# Patient Record
Sex: FEMALE | Race: WHITE | NOT HISPANIC OR LATINO | Employment: PART TIME | URBAN - METROPOLITAN AREA
[De-identification: names, ages, dates, MRNs, and addresses within clinical notes are randomized per-mention and may not be internally consistent; named-entity substitution may affect disease eponyms.]

---

## 2018-12-03 ENCOUNTER — TELEPHONE (OUTPATIENT)
Dept: OTOLARYNGOLOGY | Facility: CLINIC | Age: 73
End: 2018-12-03

## 2018-12-03 NOTE — TELEPHONE ENCOUNTER
----- Message from Avani Al sent at 12/3/2018 10:33 AM CST -----  Contact: VALE SCOTT [[id number]]            Name of Who is Calling: VALE SCOTT [[id number]]      What is the request in detail: Patient would like to schedule new patient appt as soon as possible to see doctor for sore throat.States she is a previous patient for 30 years. Please call to schedule    Can the clinic reply by [facility]: no    What [facility] Back [facility] [phone]

## 2018-12-04 NOTE — TELEPHONE ENCOUNTER
----- Message from Kaela Reeves sent at 12/3/2018  3:25 PM CST -----  Contact: pt  Name of Who is Calling: VALE SCOTT [[id number]]        What is the request in detail: Pt returning call.. Please advise...     Can the clinic reply by [facility]: no     What [facility] [phone]

## 2020-03-09 ENCOUNTER — TELEPHONE (OUTPATIENT)
Dept: OTOLARYNGOLOGY | Facility: CLINIC | Age: 75
End: 2020-03-09

## 2020-03-09 RX ORDER — BENZONATATE 100 MG/1
CAPSULE ORAL
Qty: 40 CAPSULE | Refills: 1 | Status: SHIPPED | OUTPATIENT
Start: 2020-03-09

## 2020-03-09 NOTE — TELEPHONE ENCOUNTER
Called and spoke with pt to schedule her a new pt appointment with Dr. Aaron.      ----- Message from Miriam Tenorio sent at 3/9/2020 12:53 PM CDT -----  Contact: VALE SCOTT [[id number]]  Name of Who is Calling: VALE SCOTT [[id number]]     What is the request in detailPALVALE CRUZ [[id number]]  :Patient is requesting a call back  in regards to becoming a new Patient  Please contact to further discuss and advise      Can the clinic reply by [facility]: no     What Number to Call Back if not in [facility]:  [phone] (home)

## 2020-03-11 ENCOUNTER — TELEPHONE (OUTPATIENT)
Dept: OTOLARYNGOLOGY | Facility: CLINIC | Age: 75
End: 2020-03-11

## 2020-03-11 NOTE — TELEPHONE ENCOUNTER
----- Message from Nakia Merritt sent at 3/11/2020  8:50 AM CDT -----  Contact: Pt   Name of Who is Calling: VALE SCOTT [[id number]]    What is the request in detail: Patient is requesting a calll back regards to being seen today for a coughPlease contact to further discuss and advise      Can the clinic reply by [facility]: No     What [facility]  [phone] (home)

## 2020-03-13 ENCOUNTER — OFFICE VISIT (OUTPATIENT)
Dept: OTOLARYNGOLOGY | Facility: CLINIC | Age: 75
End: 2020-03-13
Payer: MEDICARE

## 2020-03-13 VITALS
DIASTOLIC BLOOD PRESSURE: 80 MMHG | BODY MASS INDEX: 47.05 KG/M2 | SYSTOLIC BLOOD PRESSURE: 183 MMHG | WEIGHT: 282.38 LBS | TEMPERATURE: 98 F | HEIGHT: 65 IN | HEART RATE: 63 BPM

## 2020-03-13 DIAGNOSIS — R05.9 COUGH: ICD-10-CM

## 2020-03-13 DIAGNOSIS — J34.2 NASAL SEPTAL DEVIATION: ICD-10-CM

## 2020-03-13 DIAGNOSIS — J01.90 ACUTE NON-RECURRENT SINUSITIS, UNSPECIFIED LOCATION: Primary | ICD-10-CM

## 2020-03-13 DIAGNOSIS — J30.9 ALLERGIC RHINITIS, UNSPECIFIED SEASONALITY, UNSPECIFIED TRIGGER: ICD-10-CM

## 2020-03-13 DIAGNOSIS — J45.40 MODERATE PERSISTENT ASTHMA WITHOUT COMPLICATION: ICD-10-CM

## 2020-03-13 DIAGNOSIS — R09.82 PND (POST-NASAL DRIP): ICD-10-CM

## 2020-03-13 PROCEDURE — 1101F PR PT FALLS ASSESS DOC 0-1 FALLS W/OUT INJ PAST YR: ICD-10-PCS | Mod: CPTII,S$GLB,, | Performed by: SPECIALIST

## 2020-03-13 PROCEDURE — 99214 OFFICE O/P EST MOD 30 MIN: CPT | Mod: 25,S$GLB,, | Performed by: SPECIALIST

## 2020-03-13 PROCEDURE — 1159F PR MEDICATION LIST DOCUMENTED IN MEDICAL RECORD: ICD-10-PCS | Mod: S$GLB,,, | Performed by: SPECIALIST

## 2020-03-13 PROCEDURE — 96372 THER/PROPH/DIAG INJ SC/IM: CPT | Mod: S$GLB,,, | Performed by: SPECIALIST

## 2020-03-13 PROCEDURE — 1101F PT FALLS ASSESS-DOCD LE1/YR: CPT | Mod: CPTII,S$GLB,, | Performed by: SPECIALIST

## 2020-03-13 PROCEDURE — 96372 PR INJECTION,THERAP/PROPH/DIAG2ST, IM OR SUBCUT: ICD-10-PCS | Mod: S$GLB,,, | Performed by: SPECIALIST

## 2020-03-13 PROCEDURE — 99214 PR OFFICE/OUTPT VISIT, EST, LEVL IV, 30-39 MIN: ICD-10-PCS | Mod: 25,S$GLB,, | Performed by: SPECIALIST

## 2020-03-13 PROCEDURE — 1159F MED LIST DOCD IN RCRD: CPT | Mod: S$GLB,,, | Performed by: SPECIALIST

## 2020-03-13 RX ORDER — BETAMETHASONE SODIUM PHOSPHATE AND BETAMETHASONE ACETATE 3; 3 MG/ML; MG/ML
6 INJECTION, SUSPENSION INTRA-ARTICULAR; INTRALESIONAL; INTRAMUSCULAR; SOFT TISSUE ONCE
Status: COMPLETED | OUTPATIENT
Start: 2020-03-13 | End: 2020-03-13

## 2020-03-13 RX ORDER — FLUCONAZOLE 150 MG/1
150 TABLET ORAL DAILY
Qty: 1 TABLET | Refills: 3 | Status: SHIPPED | OUTPATIENT
Start: 2020-03-13 | End: 2020-03-14

## 2020-03-13 RX ORDER — CEFTRIAXONE 1 G/1
1 INJECTION, POWDER, FOR SOLUTION INTRAMUSCULAR; INTRAVENOUS
Status: COMPLETED | OUTPATIENT
Start: 2020-03-13 | End: 2020-03-13

## 2020-03-13 RX ORDER — LIDOCAINE HYDROCHLORIDE 10 MG/ML
2 INJECTION INFILTRATION; PERINEURAL ONCE
Status: COMPLETED | OUTPATIENT
Start: 2020-03-13 | End: 2020-03-13

## 2020-03-13 RX ORDER — AMOXICILLIN AND CLAVULANATE POTASSIUM 875; 125 MG/1; MG/1
TABLET, FILM COATED ORAL
Qty: 20 TABLET | Refills: 0 | Status: SHIPPED | OUTPATIENT
Start: 2020-03-13

## 2020-03-13 RX ADMIN — CEFTRIAXONE 1 G: 1 INJECTION, POWDER, FOR SOLUTION INTRAMUSCULAR; INTRAVENOUS at 02:03

## 2020-03-13 RX ADMIN — LIDOCAINE HYDROCHLORIDE 2 ML: 10 INJECTION INFILTRATION; PERINEURAL at 03:03

## 2020-03-13 RX ADMIN — BETAMETHASONE SODIUM PHOSPHATE AND BETAMETHASONE ACETATE 6 MG: 3; 3 INJECTION, SUSPENSION INTRA-ARTICULAR; INTRALESIONAL; INTRAMUSCULAR; SOFT TISSUE at 02:03

## 2020-03-13 NOTE — PROGRESS NOTES
Subjective:       Patient ID: Rafaela Pearce is a 74 y.o. female.    Chief Complaint: Cough and shortness of breath    Seven 4-year-old female who is a patient from my former practice who has been having coughing for the last week.  She coughing paroxysms.  She also has asthma for which she uses in Advair Diskus 500/50 routinely and an albuterol HFA inhaler rescue, which she has not needed to use.  She has also been taking Tessalon Perles which of help.  The cough is subsided.  She now has  nasal congestion, postnasal drip and cough.  She does not have fever.  Initially secretions were clear and there are now yellow.    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  Her rehabilitation his been malini.  She has had 2 episodes where the prosthesis dislocated anteriorly requiring propofol sedation to relocate the prosthesis.  She is now using a walker for ambulation.    She has allergy problems in addition to her asthma.  Almost yearly she has a flare up of illness in the early spring and early fall.  She also develops secondary yeast infections whenever she takes antibiotics.  In the past Diflucan has been very effective in treating those episodes.    Review of Systems   Constitutional: Positive for fatigue. Negative for activity change, appetite change, chills and fever.   HENT: Positive for congestion, ear pain, postnasal drip, rhinorrhea, sinus pressure, sinus pain and sore throat. Negative for ear discharge, facial swelling, hearing loss, mouth sores, sneezing, tinnitus, trouble swallowing and voice change.    Eyes: Negative for photophobia, pain, discharge, redness, itching and visual disturbance.   Respiratory: Positive for cough, shortness of breath and wheezing. Negative for apnea and choking.    Cardiovascular: Negative for chest pain and palpitations.   Gastrointestinal: Negative for abdominal distention, abdominal pain, nausea and vomiting.   Musculoskeletal: Positive for  arthralgias, back pain and joint swelling. Negative for myalgias, neck pain and neck stiffness.   Skin: Negative.  Negative for color change, pallor and rash.   Allergic/Immunologic: Positive for environmental allergies. Negative for food allergies and immunocompromised state.   Neurological: Positive for headaches. Negative for dizziness, speech difficulty, weakness and light-headedness.   Hematological: Negative for adenopathy. Does not bruise/bleed easily.   Psychiatric/Behavioral: Negative for agitation, confusion, decreased concentration and sleep disturbance.       Objective:      Physical Exam   Constitutional: She is oriented to person, place, and time. She appears well-developed and well-nourished.   HENT:   Head: Normocephalic.   Right Ear: External ear and ear canal normal. Tympanic membrane is retracted. Tympanic membrane mobility is abnormal.   Left Ear: External ear and ear canal normal. Tympanic membrane is retracted. Tympanic membrane mobility is abnormal.   Nose: Mucosal edema (with inflamed turbinates bilaterall), rhinorrhea (yellow pus bilaterally) and septal deviation (To the right) present. No nasal deformity.   Mouth/Throat: Uvula is midline and mucous membranes are normal. No oral lesions. Posterior oropharyngeal erythema ( mild) present. No oropharyngeal exudate (erythema thin pus from the nasopharynx). No tonsillar exudate.   Eyes: Pupils are equal, round, and reactive to light. EOM and lids are normal. Right eye exhibits no discharge. Left eye exhibits no discharge. Right conjunctiva is injected. Left conjunctiva is injected.   Neck: Trachea normal, normal range of motion, full passive range of motion without pain and phonation normal. Neck supple. No neck rigidity. No thyroid mass and no thyromegaly present.   Cardiovascular: Normal rate, regular rhythm and normal heart sounds.   Pulmonary/Chest: No respiratory distress. She has decreased breath sounds ( Diffusely). She has no wheezes. She  has no rhonchi. She has no rales.   Abdominal: Soft. Bowel sounds are normal. There is no tenderness.   Musculoskeletal: Normal range of motion.        Right shoulder: Normal.   Lymphadenopathy:        Head (right side): No occipital adenopathy present.        Head (left side): No occipital adenopathy present.     She has no cervical adenopathy.   Neurological: She is alert and oriented to person, place, and time. She has normal strength. No cranial nerve deficit or sensory deficit. Gait normal.   Skin: Skin is warm and dry. No lesion, no petechiae and no rash noted. No cyanosis. Nails show no clubbing.   Psychiatric: She has a normal mood and affect. Her speech is normal and behavior is normal. Cognition and memory are normal.       Assessment:       1. Acute non-recurrent sinusitis, unspecified location    2. PND (post-nasal drip)    3. Cough    4. Allergic rhinitis, unspecified seasonality, unspecified trigger    5. Moderate persistent asthma without complication    6. Nasal septal deviation        Plan:       I am placing the patient on Augmentin.  If her condition worsens she needs to call.  If she develops temperature above 100.4, productive cough or shortness of breath she needs to go to an emergency room immediately.  I will recheck her at the end of her antibiotics on an as-needed basis.

## 2020-06-24 ENCOUNTER — OFFICE VISIT (OUTPATIENT)
Dept: URGENT CARE | Facility: CLINIC | Age: 75
End: 2020-06-24
Payer: MEDICARE

## 2020-06-24 ENCOUNTER — TELEPHONE (OUTPATIENT)
Dept: OTOLARYNGOLOGY | Facility: CLINIC | Age: 75
End: 2020-06-24

## 2020-06-24 VITALS
HEART RATE: 62 BPM | HEIGHT: 67 IN | OXYGEN SATURATION: 95 % | RESPIRATION RATE: 16 BRPM | SYSTOLIC BLOOD PRESSURE: 127 MMHG | DIASTOLIC BLOOD PRESSURE: 68 MMHG | WEIGHT: 275 LBS | TEMPERATURE: 98 F | BODY MASS INDEX: 43.16 KG/M2

## 2020-06-24 DIAGNOSIS — R43.9 PROBLEMS WITH SMELL AND TASTE: ICD-10-CM

## 2020-06-24 DIAGNOSIS — R05.9 COUGH: ICD-10-CM

## 2020-06-24 DIAGNOSIS — B37.0 CANDIDIASIS OF MOUTH: ICD-10-CM

## 2020-06-24 DIAGNOSIS — Z01.84 ENCOUNTER FOR ANTIBODY RESPONSE EXAMINATION: ICD-10-CM

## 2020-06-24 DIAGNOSIS — Z20.822 SUSPECTED COVID-19 VIRUS INFECTION: Primary | ICD-10-CM

## 2020-06-24 PROCEDURE — 86769 SARS-COV-2 COVID-19 ANTIBODY: CPT

## 2020-06-24 PROCEDURE — 71046 X-RAY EXAM CHEST 2 VIEWS: CPT | Mod: S$GLB,,, | Performed by: RADIOLOGY

## 2020-06-24 PROCEDURE — 99214 PR OFFICE/OUTPT VISIT, EST, LEVL IV, 30-39 MIN: ICD-10-PCS | Mod: S$GLB,,, | Performed by: INTERNAL MEDICINE

## 2020-06-24 PROCEDURE — U0003 INFECTIOUS AGENT DETECTION BY NUCLEIC ACID (DNA OR RNA); SEVERE ACUTE RESPIRATORY SYNDROME CORONAVIRUS 2 (SARS-COV-2) (CORONAVIRUS DISEASE [COVID-19]), AMPLIFIED PROBE TECHNIQUE, MAKING USE OF HIGH THROUGHPUT TECHNOLOGIES AS DESCRIBED BY CMS-2020-01-R: HCPCS

## 2020-06-24 PROCEDURE — 99214 OFFICE O/P EST MOD 30 MIN: CPT | Mod: S$GLB,,, | Performed by: INTERNAL MEDICINE

## 2020-06-24 PROCEDURE — 71046 XR CHEST PA AND LATERAL: ICD-10-PCS | Mod: S$GLB,,, | Performed by: RADIOLOGY

## 2020-06-24 RX ORDER — NEBIVOLOL 10 MG/1
TABLET ORAL
COMMUNITY
Start: 2014-06-06

## 2020-06-24 RX ORDER — TORSEMIDE 20 MG/1
TABLET ORAL
COMMUNITY
Start: 2020-06-15

## 2020-06-24 RX ORDER — FUROSEMIDE 20 MG/1
TABLET ORAL
COMMUNITY

## 2020-06-24 RX ORDER — NAPROXEN AND ESOMEPRAZOLE MAGNESIUM 20; 500 MG/1; MG/1
TABLET, DELAYED RELEASE ORAL
COMMUNITY

## 2020-06-24 RX ORDER — ATENOLOL 25 MG/1
TABLET ORAL
COMMUNITY

## 2020-06-24 RX ORDER — AMLODIPINE AND VALSARTAN 10; 320 MG/1; MG/1
TABLET ORAL
COMMUNITY
Start: 2014-06-06

## 2020-06-24 RX ORDER — FLUCONAZOLE 150 MG/1
150 TABLET ORAL DAILY
Qty: 2 TABLET | Refills: 0 | Status: SHIPPED | OUTPATIENT
Start: 2020-06-24 | End: 2020-06-25

## 2020-06-24 RX ORDER — SERTRALINE HYDROCHLORIDE 100 MG/1
TABLET, FILM COATED ORAL
COMMUNITY

## 2020-06-24 RX ORDER — HYDRALAZINE HYDROCHLORIDE 10 MG/1
TABLET, FILM COATED ORAL
COMMUNITY

## 2020-06-24 RX ORDER — ROSUVASTATIN CALCIUM 10 MG/1
TABLET, COATED ORAL
COMMUNITY

## 2020-06-24 RX ORDER — CELECOXIB 200 MG/1
CAPSULE ORAL
COMMUNITY
Start: 2014-06-23

## 2020-06-24 RX ORDER — GABAPENTIN 300 MG/1
CAPSULE ORAL
COMMUNITY
Start: 2020-01-13

## 2020-06-24 RX ORDER — FLUTICASONE PROPIONATE AND SALMETEROL 500; 50 UG/1; UG/1
1 POWDER RESPIRATORY (INHALATION) 2 TIMES DAILY
COMMUNITY
Start: 2020-04-01

## 2020-06-24 NOTE — PROGRESS NOTES
"Subjective:       Patient ID: Rafaela Pearce is a 75 y.o. female.    Vitals:  height is 5' 7" (1.702 m) and weight is 124.7 kg (275 lb). Her tympanic temperature is 98.3 °F (36.8 °C). Her blood pressure is 127/68 and her pulse is 62. Her respiration is 16 and oxygen saturation is 95%.     Chief Complaint: Dizziness and COVID-19 Concerns    74 y/o female with PMHx of HTN, HLD, Asthma, Vertigo, and Polio presents to urgent care c/o dizziness intermittently for 6 months. Patient has Hx of Vertigo and states that it feels similar but she also feels fatigued and started with a dry cough a few days ago. Patient also reports spots on tongue and lost of taste since end of March for which she has been treated for with diflucan before by PCP and she would like a refill. Patient also requests to be covid tested.  Pt has been taking Meclizine intermittently with relief. Pt denies recent falls/head trauma/illness/travel, fever, chills, ear pain, sore throat, nasal congestion, cough, SOB, chest pain, leg pain/swelling, N/V/D, abdominal pain, back pain, neck pain, headache, slurred speech, syncope, vision changes, numbness or focal weakness.      Dizziness:   Chronicity:  Recurrent and chronic  Onset:  More than 1 month ago (6 months off and on worse lately)  Progression since onset:  Gradually worsening  Duration: always dizzy.  Dizziness characteristics:  Sensation of movement and lightheaded/impending faint   Associated symptoms: light-headedness.no ear pain, no fever, no nausea, no vomiting, no diaphoresis, no aural fullness, no syncope, no palpitations, no facial weakness, no slurred speech and no chest pain.  Exacerbated by: walking.  Treatments tried:  Meclizine  Improvements on treatment:  Moderate      Constitution: Negative for chills, sweating, fatigue and fever.   HENT: Negative for ear pain, congestion, sinus pain, sinus pressure, sore throat and voice change.    Neck: Negative for painful lymph nodes. "   Cardiovascular: Negative for chest pain, palpitations and passing out.   Eyes: Negative for eye redness.   Respiratory: Negative for chest tightness, cough, sputum production, bloody sputum, COPD, shortness of breath, stridor, wheezing and asthma.    Gastrointestinal: Negative for nausea and vomiting.   Musculoskeletal: Negative for muscle ache.   Skin: Negative for rash.   Allergic/Immunologic: Negative for seasonal allergies and asthma.   Neurological: Positive for dizziness and light-headedness.   Hematologic/Lymphatic: Negative for swollen lymph nodes.       Objective:      Physical Exam   Constitutional: She is oriented to person, place, and time. She appears well-developed. She is cooperative.  Non-toxic appearance. She does not appear ill. No distress.   HENT:   Head: Normocephalic and atraumatic.   Right Ear: Hearing, tympanic membrane, external ear and ear canal normal.   Left Ear: Hearing, tympanic membrane, external ear and ear canal normal.   Nose: Nose normal. No mucosal edema, rhinorrhea or nasal deformity. No epistaxis. Right sinus exhibits no maxillary sinus tenderness and no frontal sinus tenderness. Left sinus exhibits no maxillary sinus tenderness and no frontal sinus tenderness.   Mouth/Throat: Uvula is midline, oropharynx is clear and moist and mucous membranes are normal. Mucous membranes are moist. No trismus in the jaw. Normal dentition. No uvula swelling. No oropharyngeal exudate, posterior oropharyngeal edema or posterior oropharyngeal erythema. Oropharynx is clear.   Eyes: Pupils are equal, round, and reactive to light. Conjunctivae and lids are normal. No scleral icterus.   Neck: Trachea normal, normal range of motion, full passive range of motion without pain and phonation normal. Neck supple. No neck rigidity. No edema and no erythema present.   Cardiovascular: Normal rate, regular rhythm, normal heart sounds and normal pulses.   Pulmonary/Chest: Effort normal and breath sounds  normal. No respiratory distress. She has no decreased breath sounds. She has no wheezes. She has no rhonchi. She has no rales.   Abdominal: Normal appearance.   Musculoskeletal: Normal range of motion.         General: No swelling.   Neurological: She is alert and oriented to person, place, and time. No cranial nerve deficit or sensory deficit. She exhibits normal muscle tone. Coordination normal.   Skin: Skin is warm, dry, intact, not diaphoretic and not pale.   Psychiatric: Her speech is normal and behavior is normal. Mood, judgment and thought content normal.   Nursing note and vitals reviewed.        Assessment:       1. Suspected Covid-19 Virus Infection    2. Cough    3. Problems with smell and taste    4. Encounter for antibody response examination    5. Candidiasis of mouth        Plan:         Suspected Covid-19 Virus Infection  -     COVID-19 Routine Screening  -     XR CHEST PA AND LATERAL; Future; Expected date: 06/24/2020    Cough  -     COVID-19 Routine Screening  -     XR CHEST PA AND LATERAL; Future; Expected date: 06/24/2020    Problems with smell and taste  -     COVID-19 Routine Screening    Encounter for antibody response examination  -     COVID-19 Routine Screening  -     COVID-19 (SARS CoV-2) IgG Antibody    Candidiasis of mouth  -     Discontinue: fluconazole (DIFLUCAN) 150 MG Tab; Take 1 tablet (150 mg total) by mouth once daily. for 2 doses  Dispense: 2 tablet; Refill: 0  -     Discontinue: fluconazole (DIFLUCAN) 150 MG Tab; Take 1 tablet (150 mg total) by mouth once. for 1 dose  Dispense: 1 tablet; Refill: 1  -     fluconazole (DIFLUCAN) 150 MG Tab; Take 1 tablet (150 mg total) by mouth once. for 1 dose  Dispense: 1 tablet; Refill: 1    Xr Chest Pa And Lateral    Result Date: 6/24/2020  EXAMINATION: XR CHEST PA AND LATERAL CLINICAL HISTORY: Other general symptoms and signs TECHNIQUE: PA and lateral views of the chest were performed. COMPARISON: 07/09/2010 FINDINGS: There is no  consolidation, effusion, or pneumothorax.  Cardiomediastinal silhouette is unremarkable.  Bilateral shoulder arthroplasty noted.     No acute cardiopulmonary process. Electronically signed by: Bridger Boss MD Date:    06/24/2020 Time:    16:11       *74 y/o female with PMHx of HTN, HLD, Asthma, Vertigo, and Polio presents to urgent care c/o dizziness intermittently for 6 months. Patient has Hx of Vertigo and states that it feels similar but she also feels fatigued and started with a dry cough a few days ago. VSS. Resting and ambulatory SpO2 95-96%. PE unremarkable. CXR negative. Suggested doing an EKG to ensure nothing is going on with her heart, but she is in a rush and just wants to be evaluated for COVID. Discussed results/diagnosis/plan with patient in clinic. Instructed to follow up with PCP and/or cardiologist within 3-5 days for follow up and further workup to determine exact cause of dizziness. Patient was given strict ED instructions. Patient verbally understood and agreed with treatment plan.    Patient Instructions   Instructions for Patients with Confirmed or Suspected COVID-19    If you are awaiting your test result, you will either be called or it will be released to the patient portal.  If you have any questions about your test, please visit [email or web address] or call our COVID-19 information line at [phone].      Preventing the Spread of Coronavirus Disease 2019 (COVID-19) in Homes and Residential Communities -- Patients     Prevention steps for people with confirmed or suspected COVID-19 (including persons under investigation) who do not need to be hospitalized and people with confirmed COVID-19 who were hospitalized and determined to be medically stable to go home.    Stay home except to get medical care.    Separate yourself from other people and animals in your home.    Call ahead before visiting your doctor.    Wear a face mask.    Cover your coughs and sneezes.    Clean  your hands often.    Avoid sharing personal household items.    Clean all high-touch surfaces every day.    Monitor your symptoms. Seek prompt medical attention if your illness is worsening (e.g., difficulty breathing). Before seeking care, call your healthcare provider.    If you have a medical emergency and must call 911, notify the dispatcher that you have or are being evaluated for COVID-19. If possible, put on a face mask before emergency medical services arrive.    Use the following symptom-based strategy to return to normal activity following a suspected or confirmed case of COVID-19. Continue isolation until:   o At least 3 days (72 hours) have passed since recovery defined as resolution of fever without the use of fever-reducing medications and improvement in respiratory symptoms (e.g. cough, shortness of breath), and   o At least 10 days have passed since symptoms first appeared.     Precautions for household members, intimate partners and caregivers in a non-healthcare setting of a patient with symptomatic laboratory-confirmed COVID-19 or a patient under investigation.     Household members, intimate partners and caregivers in a non-healthcare setting may have close contact with a person with symptomatic, laboratory-confirmed COVID-19 or a person under investigation. Close contacts should monitor their health; they should call their healthcare provider right away if they develop symptoms suggestive of COVID-19 (e.g., fever, cough, shortness of breath). Close contacts should also follow these recommendations:      Make sure that you understand and can help the patient follow their healthcare providers instructions for medication(s) and care. You should help the patient with basic needs in the home and provide support for getting groceries, prescriptions, and other personal needs.    Monitor the patients symptoms. If the patient is getting sicker, call his or her healthcare provider and tell them  that the patient has laboratory-confirmed COVID-19. This will help the healthcare providers office take steps to keep people in the office or waiting room from getting infected. Ask the healthcare provider to call the local or state health department for additional guidance. If the patient has a medical emergency and you need to call 911, notify the dispatch personnel that the patient has or is being evaluated for COVID-19.    Household members should stay in another room or be  from the patient as much as possible. Household members should use a separate bedroom and bathroom, if available.    Prohibit visitors who do not have an essential need to be in the home.    Household members should care for any pets. Do not handle pets or other animals while sick.    Make sure that shared spaces in the home have good air flow, such as by an air conditioner.    Perform hand hygiene frequently. Wash your hands often with soap and water for at least 20 seconds or use an alcohol-based hand  that contains 60 to 95% alcohol, covering all surfaces of your hands and rubbing them together until they feel dry. Soap and water are preferred if hands are visibly dirty.    Avoid touching your eyes, nose and mouth with unwashed hands.    The patient should wear a face mask when you are around other people. If the patient is not able to wear a face mask (for example, because it causes trouble breathing), you, as the caregiver, should wear a mask when you are in the same room as the patient.    Wear a disposable face mask and gloves when you touch or have contact with the patients blood, stool or body fluids, such as saliva, sputum, nasal mucus, vomit and urine.   o Throw out disposable face masks and gloves after using them. Do not reuse.   o When removing personal protective equipment, first remove and dispose of gloves. Then, immediately clean your hands with soap and water or alcohol-based hand .  Next, remove and dispose of face mask, and immediately clean your hands again with soap and water or alcohol-based hand .    Avoid sharing household items with the patient. You should not share dishes, drinking glasses, cups, eating utensils, towels, bedding or other items. After the patient uses these items, you should wash them thoroughly (see below Wash laundry thoroughly).    Clean all high-touch surfaces, such as counters, tabletops, doorknobs, bathroom fixtures, toilets, phones, keyboards, tablets and bedside tables, every day. Also, clean any surfaces that may have blood, stool or body fluids on them.   o Use a household cleaning spray or wipe, according to the label instructions. Labels contain instructions for safe and effective use of the cleaning product including precautions you should take when applying the product, such as wearing gloves and making sure you have good ventilation during use of the product.    Wash laundry thoroughly.   o Immediately remove and wash clothes or bedding that have blood, stool or body fluids on them.  o Wear disposable gloves while handling soiled items and keep soiled items away from your body. Clean your hands (with soap and water or an alcohol-based hand ) immediately after removing your gloves.   o Read and follow directions on labels of laundry or clothing items and detergent. In general, using a normal laundry detergent according to washing machine instructions and dry thoroughly using the warmest temperatures recommended on the clothing label.    Place all used disposable gloves, face masks and other contaminated items in a lined container before disposing of them with other household waste. Clean your hands (with soap and water or an alcohol-based hand ) immediately after handling these items. Soap and water should be used preferentially if hands are visibly dirty.   Discuss any additional questions with your state or local health  department    *We will call you with your COVID test results within 1-3 days once they arrive.      *Please go immediately to the Emergency Department if you develop shortness of breath, chest pain, and uncontrollable fever above 100.4F            SOCIAL DISTANCE + WEAR A MASK :)    Below are suggestions for symptomatic relief:    - Tylenol every 4 hours OR ibuprofen every 6 hours as needed for pain/fever/chills/body aches  - Salt water gargles to soothe throat pain.  - Chloroseptic spray also helps to numb throat pain.  - Warm face compresses to help with facial sinus pain/pressure.  - Vicks vapor rub at night.  - Flonase OTC nasal spray for nasal congestion.  - Simple foods like chicken noodle soup, smoothies, hot tea with lemon and honey  - If you were not given a prescription cough medication, then Delsym OTC helps with coughing at night  - take tessalon pearls for daytime cough suppressant   - Zyrtec/Claritin during the day & Benadryl at night may help with any allergies      If you DO have Hypertension or palpitations, it is safe to take Coricidin HBP for relief of sinus symptoms.     Please follow up with your primary care provider within 2-5 days if your signs and symptoms have not resolved or worsen.

## 2020-06-24 NOTE — TELEPHONE ENCOUNTER
Returned pt call. Informed pt that Dr. Aaron is out the rest of the week and that the earliest appointment is on 7/2/20. Scheduled pt for that date and informed her that she'll be called if there are any cancellations.         ----- Message from Beverly Dominguez sent at 6/24/2020  8:19 AM CDT -----    Name of Who is Calling:VALE SCOTT [[id number]]    What is the request in detail: Pt would like a call back regarding a sooner appointment concerning vertigo , first available July 2 , 2020 Please contact to further discuss and advise    Can the clinic reply by [facility]: No    What Number to Call Back if not in [facility]: [phone]

## 2020-06-24 NOTE — PATIENT INSTRUCTIONS
Instructions for Patients with Confirmed or Suspected COVID-19    If you are awaiting your test result, you will either be called or it will be released to the patient portal.  If you have any questions about your test, please visit [email or web address] or call our COVID-19 information line at [phone].      Preventing the Spread of Coronavirus Disease 2019 (COVID-19) in Homes and Residential Communities -- Patients     Prevention steps for people with confirmed or suspected COVID-19 (including persons under investigation) who do not need to be hospitalized and people with confirmed COVID-19 who were hospitalized and determined to be medically stable to go home.    Stay home except to get medical care.    Separate yourself from other people and animals in your home.    Call ahead before visiting your doctor.    Wear a face mask.    Cover your coughs and sneezes.    Clean your hands often.    Avoid sharing personal household items.    Clean all high-touch surfaces every day.    Monitor your symptoms. Seek prompt medical attention if your illness is worsening (e.g., difficulty breathing). Before seeking care, call your healthcare provider.    If you have a medical emergency and must call 911, notify the dispatcher that you have or are being evaluated for COVID-19. If possible, put on a face mask before emergency medical services arrive.    Use the following symptom-based strategy to return to normal activity following a suspected or confirmed case of COVID-19. Continue isolation until:   o At least 3 days (72 hours) have passed since recovery defined as resolution of fever without the use of fever-reducing medications and improvement in respiratory symptoms (e.g. cough, shortness of breath), and   o At least 10 days have passed since symptoms first appeared.     Precautions for household members, intimate partners and caregivers in a non-healthcare setting of a patient with symptomatic  laboratory-confirmed COVID-19 or a patient under investigation.     Household members, intimate partners and caregivers in a non-healthcare setting may have close contact with a person with symptomatic, laboratory-confirmed COVID-19 or a person under investigation. Close contacts should monitor their health; they should call their healthcare provider right away if they develop symptoms suggestive of COVID-19 (e.g., fever, cough, shortness of breath). Close contacts should also follow these recommendations:      Make sure that you understand and can help the patient follow their healthcare providers instructions for medication(s) and care. You should help the patient with basic needs in the home and provide support for getting groceries, prescriptions, and other personal needs.    Monitor the patients symptoms. If the patient is getting sicker, call his or her healthcare provider and tell them that the patient has laboratory-confirmed COVID-19. This will help the healthcare providers office take steps to keep people in the office or waiting room from getting infected. Ask the healthcare provider to call the local or [facility] health department for additional guidance. If the patient has a medical emergency and you need to call 911, notify the dispatch personnel that the patient has or is being evaluated for COVID-19.    Household members should stay in another room or be  from the patient as much as possible. Household members should use a separate bedroom and bathroom, if available.    Prohibit visitors who do not have an essential need to be in the home.    Household members should care for any pets. Do not handle pets or other animals while sick.    Make sure that shared spaces in the home have good air flow, such as by an air conditioner.    Perform hand hygiene frequently. Wash your hands often with soap and water for at least 20 seconds or use an alcohol-based hand  that contains 60 to  95% alcohol, covering all surfaces of your hands and rubbing them together until they feel dry. Soap and water are preferred if hands are visibly dirty.    Avoid touching your eyes, nose and mouth with unwashed hands.    The patient should wear a face mask when you are around other people. If the patient is not able to wear a face mask (for example, because it causes trouble breathing), you, as the caregiver, should wear a mask when you are in the same room as the patient.    Wear a disposable face mask and gloves when you touch or have contact with the patients blood, stool or body fluids, such as saliva, sputum, nasal mucus, vomit and urine.   o Throw out disposable face masks and gloves after using them. Do not reuse.   o When removing personal protective equipment, first remove and dispose of gloves. Then, immediately clean your hands with soap and water or alcohol-based hand . Next, remove and dispose of face mask, and immediately clean your hands again with soap and water or alcohol-based hand .    Avoid sharing household items with the patient. You should not share dishes, drinking glasses, cups, eating utensils, towels, bedding or other items. After the patient uses these items, you should wash them thoroughly (see below Wash laundry thoroughly).    Clean all high-touch surfaces, such as counters, tabletops, doorknobs, bathroom fixtures, toilets, phones, keyboards, tablets and bedside tables, every day. Also, clean any surfaces that may have blood, stool or body fluids on them.   o Use a household cleaning spray or wipe, according to the label instructions. Labels contain instructions for safe and effective use of the cleaning product including precautions you should take when applying the product, such as wearing gloves and making sure you have good ventilation during use of the product.    Wash laundry thoroughly.   o Immediately remove and wash clothes or bedding that have  blood, stool or body fluids on them.  o Wear disposable gloves while handling soiled items and keep soiled items away from your body. Clean your hands (with soap and water or an alcohol-based hand ) immediately after removing your gloves.   o Read and follow directions on labels of laundry or clothing items and detergent. In general, using a normal laundry detergent according to washing machine instructions and dry thoroughly using the warmest temperatures recommended on the clothing label.    Place all used disposable gloves, face masks and other contaminated items in a lined container before disposing of them with other household waste. Clean your hands (with soap and water or an alcohol-based hand ) immediately after handling these items. Soap and water should be used preferentially if hands are visibly dirty.   Discuss any additional questions with your state or local health department    *We will call you with your COVID test results within 1-3 days once they arrive.      *Please go immediately to the Emergency Department if you develop shortness of breath, chest pain, and uncontrollable fever above 100.4F            SOCIAL DISTANCE + WEAR A MASK :)    Below are suggestions for symptomatic relief:    - Tylenol every 4 hours OR ibuprofen every 6 hours as needed for pain/fever/chills/body aches  - Salt water gargles to soothe throat pain.  - Chloroseptic spray also helps to numb throat pain.  - Warm face compresses to help with facial sinus pain/pressure.  - Vicks vapor rub at night.  - Flonase OTC nasal spray for nasal congestion.  - Simple foods like chicken noodle soup, smoothies, hot tea with lemon and honey  - If you were not given a prescription cough medication, then Delsym OTC helps with coughing at night  - take tessalon pearls for daytime cough suppressant   - Zyrtec/Claritin during the day & Benadryl at night may help with any allergies      If you DO have Hypertension or  palpitations, it is safe to take Coricidin HBP for relief of sinus symptoms.     Please follow up with your primary care provider within 2-5 days if your signs and symptoms have not resolved or worsen.

## 2020-06-25 LAB — SARS-COV-2 IGG SERPLBLD QL IA.RAPID: NEGATIVE

## 2020-06-25 RX ORDER — FLUCONAZOLE 150 MG/1
150 TABLET ORAL ONCE
Qty: 1 TABLET | Refills: 1 | Status: SHIPPED | OUTPATIENT
Start: 2020-06-25 | End: 2020-06-25

## 2020-06-28 ENCOUNTER — TELEPHONE (OUTPATIENT)
Dept: URGENT CARE | Facility: CLINIC | Age: 75
End: 2020-06-28

## 2020-06-28 LAB — SARS-COV-2 RNA RESP QL NAA+PROBE: NOT DETECTED

## 2020-07-02 ENCOUNTER — OFFICE VISIT (OUTPATIENT)
Dept: OTOLARYNGOLOGY | Facility: CLINIC | Age: 75
End: 2020-07-02
Payer: MEDICARE

## 2020-07-02 ENCOUNTER — TELEPHONE (OUTPATIENT)
Dept: OTOLARYNGOLOGY | Facility: CLINIC | Age: 75
End: 2020-07-02

## 2020-07-02 VITALS
TEMPERATURE: 97 F | BODY MASS INDEX: 43.48 KG/M2 | HEART RATE: 65 BPM | SYSTOLIC BLOOD PRESSURE: 149 MMHG | WEIGHT: 277.63 LBS | DIASTOLIC BLOOD PRESSURE: 82 MMHG

## 2020-07-02 DIAGNOSIS — J34.2 NASAL SEPTAL DEVIATION: ICD-10-CM

## 2020-07-02 DIAGNOSIS — B37.0 ORAL CANDIDIASIS: ICD-10-CM

## 2020-07-02 DIAGNOSIS — R43.9 SMELL AND TASTE DISORDER: ICD-10-CM

## 2020-07-02 DIAGNOSIS — K14.6 TONGUE BURNING SENSATION: ICD-10-CM

## 2020-07-02 DIAGNOSIS — H93.13 TINNITUS OF BOTH EARS: ICD-10-CM

## 2020-07-02 DIAGNOSIS — R26.89 BALANCE DISORDER: Primary | ICD-10-CM

## 2020-07-02 DIAGNOSIS — J30.9 ALLERGIC RHINITIS, UNSPECIFIED SEASONALITY, UNSPECIFIED TRIGGER: ICD-10-CM

## 2020-07-02 PROCEDURE — 1159F PR MEDICATION LIST DOCUMENTED IN MEDICAL RECORD: ICD-10-PCS | Mod: S$GLB,,, | Performed by: SPECIALIST

## 2020-07-02 PROCEDURE — 1159F MED LIST DOCD IN RCRD: CPT | Mod: S$GLB,,, | Performed by: SPECIALIST

## 2020-07-02 PROCEDURE — 99214 OFFICE O/P EST MOD 30 MIN: CPT | Mod: S$GLB,,, | Performed by: SPECIALIST

## 2020-07-02 PROCEDURE — 99214 PR OFFICE/OUTPT VISIT, EST, LEVL IV, 30-39 MIN: ICD-10-PCS | Mod: S$GLB,,, | Performed by: SPECIALIST

## 2020-07-02 PROCEDURE — 1101F PT FALLS ASSESS-DOCD LE1/YR: CPT | Mod: CPTII,S$GLB,, | Performed by: SPECIALIST

## 2020-07-02 PROCEDURE — 1101F PR PT FALLS ASSESS DOC 0-1 FALLS W/OUT INJ PAST YR: ICD-10-PCS | Mod: CPTII,S$GLB,, | Performed by: SPECIALIST

## 2020-07-02 PROCEDURE — 3008F BODY MASS INDEX DOCD: CPT | Mod: CPTII,S$GLB,, | Performed by: SPECIALIST

## 2020-07-02 PROCEDURE — 3008F PR BODY MASS INDEX (BMI) DOCUMENTED: ICD-10-PCS | Mod: CPTII,S$GLB,, | Performed by: SPECIALIST

## 2020-07-02 RX ORDER — FLUCONAZOLE 100 MG/1
TABLET ORAL
Qty: 15 TABLET | Refills: 2 | Status: SHIPPED | OUTPATIENT
Start: 2020-07-02

## 2020-07-02 NOTE — TELEPHONE ENCOUNTER
----- Message from Astrid Morillo sent at 7/2/2020  4:32 PM CDT -----  Name of Who is Calling: pt    What is the request in detail: is returning a call to [place]. Please contact to further discuss and advise      Can the clinic reply by [facility]: no    What Number to Call Back if not in [facility]: [phone]

## 2020-07-02 NOTE — PROGRESS NOTES
Subjective:       Patient ID: Rafaela Pearce is a 75 y.o. female.    Chief Complaint: Tinnitus and Dizziness   PRESENT ILLNESS:  THE PATIENT IS COMING IN FOR EVALUATION OF MULTIPLE PROBLEMS.  1.  She has been having discomfort of her tongue and white spots on the tongue for the last 2 weeks.  She did take 1 Diflucan 150 mg tablet which is improved symptoms significantly.  2.  She has been having bilateral tinnitus for the last month.  It is the buzzing sound of an insect.  3.  She has been having recurring episodes of imbalance and vertigo.  They typically occur when she 1st gets up the morning gets out of bed.  They also occur when she goes upper down an incline.  Loud noises seem to exacerbate the problem.  She has a history of BPPV 6 years ago.    Ringing in Ears:    Associated symptoms: dizziness and tinnitus.     PMH includes: dizziness.    Dizziness:    Associated symptoms: tinnitus.    Review of Systems   Constitutional: Positive for fatigue. Negative for activity change, appetite change and chills.   HENT: Positive for congestion, postnasal drip, sinus pressure, sinus pain, sore throat and tinnitus. Negative for ear discharge, facial swelling, mouth sores, sneezing, trouble swallowing and voice change.    Eyes: Negative for photophobia, pain, discharge, redness, itching and visual disturbance.   Respiratory: Positive for cough, shortness of breath and wheezing. Negative for apnea and choking.    Gastrointestinal: Negative for abdominal distention and abdominal pain.   Musculoskeletal: Positive for arthralgias, back pain, gait problem and joint swelling. Negative for myalgias, neck pain and neck stiffness.        History of bilateral hip replacement, right total knee replacement, bilateral shoulder replacements with a revision on the left   Skin: Negative.  Negative for color change, pallor and rash.   Allergic/Immunologic: Negative for food allergies and immunocompromised state.   Neurological: Positive  for dizziness. Negative for speech difficulty.   Hematological: Negative for adenopathy. Does not bruise/bleed easily.   Psychiatric/Behavioral: Negative for agitation, confusion, decreased concentration and sleep disturbance.       Objective:      Physical Exam  Constitutional:       Appearance: She is well-developed.   HENT:      Head: Normocephalic.      Right Ear: Ear canal and external ear normal. Tympanic membrane is retracted. Tympanic membrane has decreased mobility.      Left Ear: Ear canal and external ear normal. Tympanic membrane is retracted. Tympanic membrane has decreased mobility.      Nose: Septal deviation (To the rightTo the right), mucosal edema (with inflamed turbinates bilaterall) and rhinorrhea (yellow pus bilaterally) present. No nasal deformity.      Mouth/Throat:      Lips: No lesions.      Mouth: Mucous membranes are moist. No oral lesions.      Tongue: No lesions ( mild erythema of the base of tongue and minimal white coating on the dorsum of the base of tongue).      Pharynx: Uvula midline. No oropharyngeal exudate (erythema thin pus from the nasopharynx) or posterior oropharyngeal erythema ( mild).      Tonsils: No tonsillar exudate.   Eyes:      General: Lids are normal.         Right eye: No discharge.         Left eye: No discharge.      Conjunctiva/sclera:      Right eye: Right conjunctiva is injected. No exudate.     Left eye: Left conjunctiva is injected. No exudate.     Pupils: Pupils are equal, round, and reactive to light.   Neck:      Musculoskeletal: Full passive range of motion without pain, normal range of motion and neck supple. No neck rigidity or muscular tenderness.      Thyroid: No thyroid mass or thyromegaly.      Trachea: Trachea and phonation normal. No tracheal deviation.   Cardiovascular:      Rate and Rhythm: Normal rate and regular rhythm.      Pulses: Normal pulses.      Heart sounds: Normal heart sounds.   Pulmonary:      Effort: Pulmonary effort is normal. No  respiratory distress.      Breath sounds: No stridor. Decreased breath sounds ( Diffusely) present. No wheezing, rhonchi or rales.   Abdominal:      General: Bowel sounds are normal.      Palpations: Abdomen is soft.      Tenderness: There is no abdominal tenderness.   Musculoskeletal: Normal range of motion.      Right shoulder: Normal.   Lymphadenopathy:      Head:      Right side of head: No submental, submandibular, preauricular, posterior auricular or occipital adenopathy.      Left side of head: No submental, submandibular, preauricular, posterior auricular or occipital adenopathy.      Cervical: No cervical adenopathy.   Skin:     General: Skin is warm and dry.      Findings: No lesion, petechiae or rash.      Nails: There is no clubbing.     Neurological:      Mental Status: She is alert and oriented to person, place, and time.      Cranial Nerves: No cranial nerve deficit.      Sensory: No sensory deficit.      Gait: Gait normal.      Comments: Neuro otologic-no nystagmus, cranial nerves intact, wide-based gait that is unsteady, unable due to perform tandem gait, walks with a cane, Romberg unsteady, unable to perform tandem Romberg, patient uses a cane for ambulation normally   Psychiatric:         Speech: Speech normal.         Behavior: Behavior normal. Behavior is cooperative.         Thought Content: Thought content normal.         Judgment: Judgment normal.         Adrianna-Hallpike maneuver:  Negative for BPPV bilaterally    COVID-19 IgG antibodies (ordered by another practitioner):  Negative    Assessment:       1. Balance disorder    2. Tinnitus of both ears    3. Smell and taste disorder    4. Oral candidiasis    5. Tongue burning sensation    6. Allergic rhinitis, unspecified seasonality, unspecified trigger    7. Nasal septal deviation        Plan:       I placing the patient on Diflucan for 2 weeks and will recheck her tandem that 2 weeks.  She needs to undergo an audioular evaluation during that  time frame.

## 2020-07-24 ENCOUNTER — CLINICAL SUPPORT (OUTPATIENT)
Dept: AUDIOLOGY | Facility: CLINIC | Age: 75
End: 2020-07-24
Payer: MEDICARE

## 2020-07-24 DIAGNOSIS — H93.13 TINNITUS OF BOTH EARS: ICD-10-CM

## 2020-07-24 DIAGNOSIS — H90.3 BILATERAL SENSORINEURAL HEARING LOSS: Primary | ICD-10-CM

## 2020-07-24 DIAGNOSIS — H81.12 BENIGN PAROXYSMAL POSITIONAL VERTIGO OF LEFT EAR: Primary | ICD-10-CM

## 2020-07-24 DIAGNOSIS — H81.391 PERIPHERAL VERTIGO, RIGHT: ICD-10-CM

## 2020-07-24 PROCEDURE — 92540 PR VESTIBULAR EVAL NYSTAG FOVL&PERPH STIM OSCIL TRACKING: ICD-10-PCS | Mod: S$GLB,,, | Performed by: AUDIOLOGIST-HEARING AID FITTER

## 2020-07-24 PROCEDURE — 99999 PR PBB SHADOW E&M-EST. PATIENT-LVL I: CPT | Mod: PBBFAC,,, | Performed by: AUDIOLOGIST

## 2020-07-24 PROCEDURE — 92537 PR CALORIC VSTBLR TEST W/REC BITHERMAL: ICD-10-PCS | Mod: S$GLB,,, | Performed by: AUDIOLOGIST-HEARING AID FITTER

## 2020-07-24 PROCEDURE — 92557 PR COMPREHENSIVE HEARING TEST: ICD-10-PCS | Mod: S$GLB,,, | Performed by: AUDIOLOGIST

## 2020-07-24 PROCEDURE — 92557 COMPREHENSIVE HEARING TEST: CPT | Mod: S$GLB,,, | Performed by: AUDIOLOGIST

## 2020-07-24 PROCEDURE — 92540 BASIC VESTIBULAR EVALUATION: CPT | Mod: S$GLB,,, | Performed by: AUDIOLOGIST-HEARING AID FITTER

## 2020-07-24 PROCEDURE — 92537 CALORIC VSTBLR TEST W/REC: CPT | Mod: S$GLB,,, | Performed by: AUDIOLOGIST-HEARING AID FITTER

## 2020-07-24 PROCEDURE — 92567 PR TYMPA2METRY: ICD-10-PCS | Mod: S$GLB,,, | Performed by: AUDIOLOGIST

## 2020-07-24 PROCEDURE — 99999 PR PBB SHADOW E&M-EST. PATIENT-LVL I: ICD-10-PCS | Mod: PBBFAC,,, | Performed by: AUDIOLOGIST

## 2020-07-24 PROCEDURE — 92567 TYMPANOMETRY: CPT | Mod: S$GLB,,, | Performed by: AUDIOLOGIST

## 2020-07-24 NOTE — Clinical Note
Dr. Aaron,    Here are the VNG results on your patient. Please let me know if you have any questions.    Thanks,   Brandt Freitas, CCC-A

## 2020-07-24 NOTE — Clinical Note
Hi Dr. Aaron,    Here are the results for Mrs. Pearce's hearing evaluation. Please let me know if you need anything else.    Brandt Ramirez, CCC-A

## 2020-07-24 NOTE — PROGRESS NOTES
Rafaela Pearce was seen today in the clinic for an audiologic evaluation.  Patients main complaint was dizziness, tinnitus, and bilateral aural fullness.  Mrs. Pearce reported that she has been dizzy for several weeks and reports her symptoms as unsteadiness. Patient denied true vertigo.    Tympanometry revealed Type Ad (hypercompliant), bilaterally. Audiogram results revealed normal hearing (250-500 Hz) to a mild to moderate sensorineural hearing loss (6507-8290 Hz) in the right ear and normal hearing (250-1500 Hz) to a mild sloping to moderate sensorineural hearing loss (6408-1725 Hz) in the left ear.  Speech reception thresholds were noted at 15 dB in the right ear and 20 dB in the left ear.  Speech discrimination scores were 92% in the right ear and 96% in the left ear.    Recommendations:  1. Otologic evaluation  2. Annual audiogram  3. Noise protection when in noise  4. Hearing aid consultation

## 2020-07-24 NOTE — PROGRESS NOTES
VNG/Posturography Evaluation    Referring physician:  Dr. Aaron    75 y.o. female complains of imbalance.  Symptoms are provoked by airising from bed and moving up or down and have been recurring over the past few months.    Gaze nystagmus was absent.    Oculomotor function was normal.    Spontaneous nystagmus was absent.    The head-hanging left Hallpike revealed torsional, fatiguable nystagmus in the supine position with vertigo.    The head-hanging right Hallpike was negative.    Static positional nystagmus was absent.    Unilateral weakness: 100% (right)  Directional preponderance 15% (left beating)    RW: 0 d/s  LW: 44 d/s  RC: 0 d/s   d/s    Fixation suppression was normal.    Impression: Left-sided posterior-canal BPPV in the presence of a right peripheral vestibular abnormality.    Recommend: Formal vestibular rehabilitation due to patient's main complaint of imbalance. A left Epley maneuver was not performed in the clinic today due to patient's limited mobility.

## 2020-07-27 ENCOUNTER — OFFICE VISIT (OUTPATIENT)
Dept: OTOLARYNGOLOGY | Facility: CLINIC | Age: 75
End: 2020-07-27
Payer: MEDICARE

## 2020-07-27 VITALS
TEMPERATURE: 98 F | HEART RATE: 58 BPM | DIASTOLIC BLOOD PRESSURE: 70 MMHG | WEIGHT: 282.31 LBS | HEIGHT: 67 IN | SYSTOLIC BLOOD PRESSURE: 130 MMHG | BODY MASS INDEX: 44.31 KG/M2

## 2020-07-27 DIAGNOSIS — H81.12 BPPV (BENIGN PAROXYSMAL POSITIONAL VERTIGO), LEFT: Primary | ICD-10-CM

## 2020-07-27 DIAGNOSIS — J34.2 NASAL SEPTAL DEVIATION: ICD-10-CM

## 2020-07-27 DIAGNOSIS — H81.391 PERIPHERAL VERTIGO INVOLVING RIGHT EAR: ICD-10-CM

## 2020-07-27 DIAGNOSIS — H90.3 SENSORINEURAL HEARING LOSS (SNHL) OF BOTH EARS: ICD-10-CM

## 2020-07-27 DIAGNOSIS — J30.9 ALLERGIC RHINITIS, UNSPECIFIED SEASONALITY, UNSPECIFIED TRIGGER: ICD-10-CM

## 2020-07-27 DIAGNOSIS — R43.9 SMELL AND TASTE DISORDER: ICD-10-CM

## 2020-07-27 DIAGNOSIS — H69.93 DYSFUNCTION OF BOTH EUSTACHIAN TUBES: ICD-10-CM

## 2020-07-27 PROCEDURE — 1159F PR MEDICATION LIST DOCUMENTED IN MEDICAL RECORD: ICD-10-PCS | Mod: S$GLB,,, | Performed by: SPECIALIST

## 2020-07-27 PROCEDURE — 1101F PR PT FALLS ASSESS DOC 0-1 FALLS W/OUT INJ PAST YR: ICD-10-PCS | Mod: CPTII,S$GLB,, | Performed by: SPECIALIST

## 2020-07-27 PROCEDURE — 99214 OFFICE O/P EST MOD 30 MIN: CPT | Mod: S$GLB,,, | Performed by: SPECIALIST

## 2020-07-27 PROCEDURE — 1126F PR PAIN SEVERITY QUANTIFIED, NO PAIN PRESENT: ICD-10-PCS | Mod: S$GLB,,, | Performed by: SPECIALIST

## 2020-07-27 PROCEDURE — 1101F PT FALLS ASSESS-DOCD LE1/YR: CPT | Mod: CPTII,S$GLB,, | Performed by: SPECIALIST

## 2020-07-27 PROCEDURE — 99214 PR OFFICE/OUTPT VISIT, EST, LEVL IV, 30-39 MIN: ICD-10-PCS | Mod: S$GLB,,, | Performed by: SPECIALIST

## 2020-07-27 PROCEDURE — 1126F AMNT PAIN NOTED NONE PRSNT: CPT | Mod: S$GLB,,, | Performed by: SPECIALIST

## 2020-07-27 PROCEDURE — 3008F BODY MASS INDEX DOCD: CPT | Mod: CPTII,S$GLB,, | Performed by: SPECIALIST

## 2020-07-27 PROCEDURE — 3008F PR BODY MASS INDEX (BMI) DOCUMENTED: ICD-10-PCS | Mod: CPTII,S$GLB,, | Performed by: SPECIALIST

## 2020-07-27 PROCEDURE — 1159F MED LIST DOCD IN RCRD: CPT | Mod: S$GLB,,, | Performed by: SPECIALIST

## 2020-07-27 RX ORDER — OXYCODONE HYDROCHLORIDE 5 MG/1
TABLET ORAL
COMMUNITY
Start: 2020-07-17

## 2020-07-27 RX ORDER — HYDRALAZINE HYDROCHLORIDE 25 MG/1
TABLET, FILM COATED ORAL
COMMUNITY
Start: 2020-07-10

## 2020-07-27 NOTE — PATIENT INSTRUCTIONS
Benign Paroxysmal Positional Vertigo     Your health care provider may move your head in certain ways to treat your BPPV.     Benign paroxysmal positional vertigo (BPPV) is a problem with the inner ear. The inner ear contains the vestibular system. This system is what helps you keep your balance. BPPV causes a feeling of spinning. It is a common problem of the vestibular system.  Understanding the vestibular system  The vestibular system of the ear is made up of very tiny parts. They include the utricle, saccule, and semicircular canals. The utricle is a tiny organ that contains calcium crystals. In some people, the crystals can move into the semicircular canals. When this happens, the system no longer works as it should. This causes BPPV. Benign means it is not life-threatening. Paroxysmal means it happens suddenly. Positional means that it happens when you move your head. Vertigo is a feeling of spinning.  What causes BPPV?  Causes include injury to your head or neck. Other problems with the vestibular system may cause BPPV. In many people, the cause of BPPV is not known.  Symptoms of BPPV  You many have repeated feelings of spinning (vertigo). The vertigo usually lasts less than 1 minute. Some movements, suchas rolling over in bed, can bring on vertigo.  Diagnosing BPPV  Your primary health care provider may diagnose and treat your BPPV. Or you may see an ear, nose, and throat doctor (otolaryngologist). In some cases, you may see a nervous system doctor (neurologist).  The health care provider will ask about your symptoms and your medical history. He or she will examine you. You may have hearing and balance tests. As part of the exam, your health care provider may have you move your head and body in certain ways. If you have BPPV, the movements can bring on vertigo. Your provider will also look for abnormal movements of your eyes. You may have other tests to check your vestibular or nervous systems.  Treatment  for BPPV  Your health care provider may try to move the calcium crystals. This is done by having you move your head and neck in certain ways. This treatment is safe and often works well. You may also be told to do these movements at home. You may still have vertigo for a few weeks. Your health care provider will recheck your symptoms, usually in about a month. Special physical therapy may also be part of treatment. In rare cases surgery may be needed for BPPV that does not go away.     When to call the health care provider  Call your health care provider right away if you have any of these:  · Symptoms that do not go away with treatment  · Symptoms that get worse  · New symptoms   Date Last Reviewed: 3/19/2015  © [id number] [employer]. [address] [place] [address], [place] [address]. All rights reserved. This information is not intended as a substitute for professional medical care. Always follow your healthcare professional's instructions.

## 2020-07-27 NOTE — PROGRESS NOTES
Subjective:       Patient ID: Rafaela Pearce is a 75 y.o. female.    Chief Complaint: Follow-up (with hearing test results)    Follow-up     Overall, the patient feels like she has improved somewhat.  She continues to have episodes of unsteadiness there characterized as the room feeling like it is tilting rather than vertigo.  These episodes occur primarily when she is changing position from sitting to standing, crusting over threshold between rooms and when walking up in clients.  She does have a sensation at times that she is talking in a barrel and has trouble with background noise.      Review of Systems   Constitutional: Negative for activity change and appetite change.   HENT: Positive for ear pain, postnasal drip, rhinorrhea, sinus pressure and sinus pain. Negative for ear discharge, facial swelling, hearing loss, mouth sores, sneezing, tinnitus, trouble swallowing and voice change.    Eyes: Negative for photophobia, pain, discharge, redness, itching and visual disturbance.   Respiratory: Positive for shortness of breath and wheezing. Negative for apnea and choking.    Cardiovascular: Negative for palpitations.   Gastrointestinal: Negative for abdominal distention.   Musculoskeletal: Positive for back pain. Negative for neck stiffness.   Skin: Negative.  Negative for color change and pallor.   Allergic/Immunologic: Positive for environmental allergies. Negative for food allergies and immunocompromised state.   Neurological: Negative for dizziness, speech difficulty and light-headedness.   Hematological: Negative for adenopathy. Does not bruise/bleed easily.   Psychiatric/Behavioral: Negative for agitation, confusion, decreased concentration and sleep disturbance.       Objective:      Physical Exam  Constitutional:       Appearance: She is well-developed.   HENT:      Head: Normocephalic.      Right Ear: Ear canal and external ear normal. Tympanic membrane is retracted. Tympanic membrane has decreased  mobility.      Left Ear: Ear canal and external ear normal. Tympanic membrane is retracted. Tympanic membrane has decreased mobility.      Nose: Septal deviation (To the right), mucosal edema (with inflamed turbinates bilaterall) and rhinorrhea (yellow pus bilaterally) present. No nasal deformity.      Mouth/Throat:      Mouth: No oral lesions.      Pharynx: Uvula midline. Posterior oropharyngeal erythema ( mild) present. No oropharyngeal exudate (erythema thin pus from the nasopharynx).      Tonsils: No tonsillar exudate.   Eyes:      General: Lids are normal.         Right eye: No discharge.         Left eye: No discharge.      Conjunctiva/sclera:      Right eye: Right conjunctiva is injected.      Left eye: Left conjunctiva is injected.      Pupils: Pupils are equal, round, and reactive to light.   Neck:      Musculoskeletal: Full passive range of motion without pain, normal range of motion and neck supple. No neck rigidity.      Thyroid: No thyroid mass or thyromegaly.      Trachea: Trachea and phonation normal.   Cardiovascular:      Rate and Rhythm: Normal rate and regular rhythm.      Heart sounds: Normal heart sounds.   Pulmonary:      Effort: No respiratory distress.      Breath sounds: Decreased breath sounds ( Diffusely) present. No wheezing, rhonchi or rales.   Abdominal:      General: Bowel sounds are normal.      Palpations: Abdomen is soft.      Tenderness: There is no abdominal tenderness.   Musculoskeletal: Normal range of motion.      Right shoulder: Normal.   Lymphadenopathy:      Head:      Right side of head: No occipital adenopathy.      Left side of head: No occipital adenopathy.      Cervical: No cervical adenopathy.   Skin:     General: Skin is warm and dry.      Findings: No lesion, petechiae or rash.      Nails: There is no clubbing.     Neurological:      Mental Status: She is alert and oriented to person, place, and time.      Cranial Nerves: No cranial nerve deficit.      Sensory: No  sensory deficit.      Gait: Gait normal.   Psychiatric:         Speech: Speech normal.         Behavior: Behavior normal.           VNG-100% reduce response on the right, left BPPV    I personally reviewed the results of the audiogram and VNG findings with patient at the end of her visit    Assessment:       1. BPPV (benign paroxysmal positional vertigo), left    2. Peripheral vertigo involving right ear    3. Sensorineural hearing loss (SNHL) of both ears    4. Allergic rhinitis, unspecified seasonality, unspecified trigger    5. Dysfunction of both eustachian tubes    6. Nasal septal deviation    7. Smell and taste disorder        Plan:       I am referring the patient for vestibular rehabilitative therapy.  I have given her a a home Epley maneuver for the left side.  I will recheck her in 4 weeks.  She is to refrain from laying on her left side.

## 2020-07-30 ENCOUNTER — CLINICAL SUPPORT (OUTPATIENT)
Dept: REHABILITATION | Facility: HOSPITAL | Age: 75
End: 2020-07-30
Attending: SPECIALIST
Payer: MEDICARE

## 2020-07-30 DIAGNOSIS — H81.391 PERIPHERAL VERTIGO INVOLVING RIGHT EAR: ICD-10-CM

## 2020-07-30 DIAGNOSIS — H81.12 BPPV (BENIGN PAROXYSMAL POSITIONAL VERTIGO), LEFT: ICD-10-CM

## 2020-07-30 PROCEDURE — 97162 PT EVAL MOD COMPLEX 30 MIN: CPT | Mod: PO

## 2020-07-30 NOTE — PLAN OF CARE
"[facility]  Physical Therapy Neurological Rehabilitation Initial Evaluation    Name: Raafela Pearce  Clinic Number: [id number]    Therapy Diagnosis:   Encounter Diagnoses   Name Primary?    BPPV (benign paroxysmal positional vertigo), left     Peripheral vertigo involving right ear      Physician: JUDD Aaron MD    Physician Orders: PT Eval and Treat "Vestibular Therapy "  Medical Diagnosis from Referral: H81.12 (ICD-10-CM) - BPPV (benign paroxysmal positional vertigo), left H81.391 (ICD-10-CM) - Peripheral vertigo involving right ear   Evaluation Date: 7/30/2020  Insurance Authorization Period: 07/27/2020 - 07/27/2021   Plan of Care Expiration: 09/30/2020  Visit # / Visits authorized: 01/ 01    Time In: 1500  Time Out: 1545  Total Billable Time: 45 minutes    Precautions: Standard, Fall and Hx of Shoulder and Hip Replacements    Subjective   Date of onset: January following hip surgery, gradual worsening   History of current condition - Rafaela reports episodes of a "wavy environment" and feeling like "the floor goes down". She reports that the episodes are random and do not following a particular pattern but she did mention that getting up from bed can aggravate it. She also describes what sounds to be aural fullness, reporting L ear heaviness and feeling like her L ear is in a barrel. L tinnitus described as "buzzing" was reported as well.    Duration of symptoms: 1 minute or less    Frequency of symptoms: 2-3 times per day  Visual changes: None   Recent history of upper respiratory infection or GI infection: She reports flu-like symptoms in March/April      Systems screening  Cardiovascular indicators: Hypertension and Hypercholesteremia    Is patient currently taking medication for stated indicators: Yes  Neurological: None     Medical History:   Past Medical History:   Diagnosis Date    Asthma     Hyperlipidemia     Hypertension     Polio        Surgical History:   Rafaela " "Tiffany Pearec  has a past surgical history that includes Hip surgery; Knee surgery; Shoulder surgery; Breast surgery; and Hysterectomy.    Medications:   Rafaela has a current medication list which includes the following prescription(s): amlodipine-valsartan, amoxicillin-clavulanate 875-125mg, atenolol, benzonatate, celecoxib, fluconazole, furosemide, gabapentin, hydralazine, hydralazine, naproxen-esomeprazole, nebivolol, oxycodone, rosuvastatin, sertraline, torsemide, and wixela inhub.    Allergies:   Review of patient's allergies indicates:   Allergen Reactions    Codeine Anaphylaxis    Hydrocodone-acetaminophen         Imaging, none    Vestibular Function Testing/Audiogram: VNG 07/24/2020 "Impression: Left-sided posterior-canal BPPV in the presence of a right peripheral vestibular abnormality."     Prior Therapy: Orthopedic  Social History: Lives with spouse   Falls: 0   DME: SPC, 2WW,  Shower bench, rolling chair in kitchen, Riser for toilet  Home Environment: Apartment/Condo 3rd floor, Elevator to get her floor, Ramp for entry in the apartment complex  Exercise Routine / History: No   Family Present at time of Eval: Brought to evaluation by    Occupation: [employer] , sitting office work - part time  Prior Level of Function: Independent  Current Level of Function: Modified Independent with functional mobility due to safety concerns and decreased comfort related to the dizziness    Pain:  Current 2/10, worst 7/10, best 0/10   Location:  LBP  Description: Weakness, numbness  Aggravating Factors: Walking, Prolonged standing  Easing Factors: Rest    Pts goals: "I want to not be dizzy"     Objective   Outcome Measure:   DHI: 56 - Moderate    SYSTEMS SCREEN    - Follows commands: 100% of time   - Speech: no deficits     Mental status: alert, oriented to person, place, and time, normal mood, behavior, speech, dress, motor activity, and thought processes  Appearance: Casually dressed  Behavior:  " calm  Attention Span and Concentration:  Normal    Posture Alignment: WFL     Sensation: Light Touch: DNT    UPPER EXTREMITY--AROM/PROM  (R) UE: WFLs  (L) UE: WFLs         RANGE OF MOTION--LOWER EXTREMITIES  (R) LE Hip: WFL   Knee: WFL   Ankle: WFL    (L) LE: Hip: WFL   Knee: WFL   Ankle: WFL    ROM:   CERVICAL SPINE  Flexion: 65 degrees (80-90 deg)  Extension: 45 degrees  (70-80 deg)  L side bend: WNL   R side bend: WNL (20-45 degrees)  L rotation: 56 degrees   R rotation: 56 degrees (70-90 degrees)  Are concurrent symptoms present with any of these movements: Mild dizziness with cervical extension endrange    Modified VAS (Vertebral Artery Screen), in sitting (rotation, then extension):  R: Negative  L: Negative    VESTIBULAR EXAMINATION    Visual/Auditory:   Spontaneous Nystagmus: Absent with fixation present  Ocular ROM: WNL   Tracking/Smooth Pursuits: Intact  Gaze Holding Nystagmus: Absent with fixation present  Saccades: Impaired: undershooting vertically  Acuity: Impaired: Wears glasses  Visual field: DNT  Convergence: 8 cm  VOR Screen: No retinal slip, no dizziness  VOR Cancellation: Mild retinal slip observed    Head Thrust Test: Positive Loss of gaze bilaterally  Head Shaking Nystagmus: DNT    POSITIONAL CANAL TESTING  Looking for nystagmus (slow phase followed by quick phase to the affected side for BPPV)    [place] Hallpike (posterior / CL anterior)   Right : Positive upbeating, rotary nystagmus, Positive dizziness lasting ~5 seconds   Left: Positive upbeating, rotary nystagmus, Positive dizziness lasting ~5 seconds  Horizontal Canals   Right: Negative nystagmus, Negative dizziness   Left: Negative nystagmus, Negative dizziness  Treatment Performed: Modified Epley for L Posterior Canal BPPV x 1 trial      Gait Assessment:(if indicated)  - AD used: SPC  - Assistance: Mod I  - Distance: Community      CMS Impairment/Limitation/Restriction for FOTO Vertigo Survey    Therapist reviewed FOTO scores for Rafaela Allen  Ramses on 7/30/2020.   FOTO documents entered into [employer] - see Media section.    Limitation Score: 51%  Category: Mobility       TREATMENT   No treatment provided today. All time spent on evaluation.     Home Exercises and Patient Education Provided    Education provided: Examination findings, POC, scheduling, goals of therapy    Written Home Exercises Provided: No. To be established at initial follow up session.     Assessment   Rafaela is a 75 y.o. female referred to outpatient Physical Therapy with a medical diagnosis of H81.12 (ICD-10-CM) - BPPV (benign paroxysmal positional vertigo), left H81.391 (ICD-10-CM) - Peripheral vertigo involving right ear. Pt presents with bilateral posterior canal BPPV. [place]-Hallpike was positive bilaterally, with upbeating torsional nystagmus of short duration (~5 seconds) observed and symptom reproduction reported. However, patient's examination suggests concurrent vestibular hypofunction as well. Her reports of aural fullness and tinnitus as well as hearing loss noted from audiogram and VNG results suggest vestibular hypofunction. Head thrust test was positive bilaterally, with greater error noted on the left indicating impaired VOR bilaterally. She exhibited undershooting with vertical saccades and mild retinal slip with VOR Cancellation testing, suggesting possible central involvement. Modified Epley was performed for L Posterior Canal BPPV since she is more symptomatic on the L. Initial physical therapy visits will focus on canalith repositioning maneuvers to clear BPPV. If symptoms persist beyond BPPV clearance, continued vestibular assessment will be performed with updated POC to follow.     Pt prognosis is Excellent.   Pt will benefit from skilled outpatient Physical Therapy to address the deficits stated above and in the chart below, provide pt/family education, and to maximize pt's level of independence.     Plan of care discussed with patient: Yes   Pt's spiritual, cultural  and educational needs considered and patient is agreeable to the plan of care and goals as stated below:     Anticipated Barriers for therapy: Co-morbidities, Patient is not driving currently    Medical Necessity is demonstrated by the following  History  Co-morbidities and personal factors that may impact the plan of care Co-morbidities:   HTN and Hyperlipidemia    Personal Factors:   no deficits     moderate   Examination  Body Structures and Functions, activity limitations and participation restrictions that may impact the plan of care Body Regions:   head    Body Systems:    balance   Vestibular System    Participation Restrictions:   Driving    Activity limitations:   Learning and applying knowledge  no deficits    General Tasks and Commands  no deficits    Communication  no deficits    Mobility  walking    Self care  no deficits    Domestic Life  no deficits    Interactions/Relationships  no deficits    Life Areas  no deficits    Community and Social Life  no deficits         moderate   Clinical Presentation evolving clinical presentation with changing clinical characteristics moderate   Decision Making/ Complexity Score: low     Goals:  Short Term Goals: 3 weeks   1. Patient will exhibit decreased frequency of vertigo episodes to >/= 1x per day.  2. Patient will exhibit negative L Adrianna Hallpike for L Posterior Canal BPPV.  3. Patient will exhibit negative R Adrianna Hallpike for R Posterior Canal BPPV.  4. Patient will undergo remainder of vestibular assessment, pending response to canalith repositioning maneuvers.       Long Term Goals: 8 weeks   1. Patient will exhibit improved Dizziness Handicap Inventory to 39, indicating decreased impact of dizziness on daily function.  2. Pt will improve FOTO limitation score to </= 34% for improved self perception of functional mobility.      Plan   Plan of care Certification: 7/30/2020 to 09/30/2020.     Outpatient Physical Therapy 2 times weekly for 2 weeks to include the  following interventions: Neuromuscular Re-ed, Patient Education, Self Care, Therapeutic Activites, Therapeutic Exercise and Canalith Repositioning Maneuver.     Caro Griggs, PT

## 2020-08-03 ENCOUNTER — CLINICAL SUPPORT (OUTPATIENT)
Dept: REHABILITATION | Facility: HOSPITAL | Age: 75
End: 2020-08-03
Attending: SPECIALIST
Payer: MEDICARE

## 2020-08-03 DIAGNOSIS — H81.391 PERIPHERAL VERTIGO INVOLVING RIGHT EAR: ICD-10-CM

## 2020-08-03 DIAGNOSIS — H81.12 BPPV (BENIGN PAROXYSMAL POSITIONAL VERTIGO), LEFT: Primary | ICD-10-CM

## 2020-08-03 PROCEDURE — 95992 CANALITH REPOSITIONING PROC: CPT | Mod: PO

## 2020-08-03 NOTE — PROGRESS NOTES
"  Physical Therapy Daily Treatment Note     Name: Rafaela Pearce  Clinic Number: [id number]    Therapy Diagnosis:   Encounter Diagnoses   Name Primary?    BPPV (benign paroxysmal positional vertigo), left Yes    Peripheral vertigo involving right ear      Physician: JUDD Aaron MD    Visit Date: 8/3/2020    Physician Orders: PT Eval and Treat "Vestibular Therapy "  Medical Diagnosis from Referral: H81.12 (ICD-10-CM) - BPPV (benign paroxysmal positional vertigo), left H81.391 (ICD-10-CM) - Peripheral vertigo involving right ear   Evaluation Date: 7/30/2020  Insurance Authorization Period: 07/27/2020 - 07/27/2021   Plan of Care Expiration: 09/30/2020  Visit # / Visits authorized: 02/ 01 (pending additional auth)     Time In: 1515    Time Out: 1535   Total Billable Time: 20minutes      Precautions: Standard, Fall and Hx of Shoulder and Hip Replacements     Missed visits: 0  Cancelled visits: 0  Subjective     Pt reports: increased dizziness on the day following evaluation. Then improved dizziness thereafter.   HEP not provided at this time  Response to previous treatment: Overall improvement of dizziness  Functional change: Ongoing     Pain: 0/10  Location:  n/a     Objective     POSITIONAL CANAL TESTING  Looking for nystagmus (slow phase followed by quick phase to the affected side for BPPV)    Trial 1:  [place] Hallpike (posterior / CL anterior)   Right : DNT   Left: Positive rotary nystagmus ~10 seconds nystagmus, Positive dizziness  Horizontal Canals   Right: DNT   Left: DNT    Treatment Performed:    Modified Epley: Trial 1   Position 1: Positive rotary nystagmus lasting ~10 seconds; positive dizziness   Position 2: Positive rotary nystagmus lasting ~5 seconds; positive dizziness   Position 3: mild fleeting dizziness   Position 4: negative nystagmus; negative dizziness    Trial 2:  [place] Hallpike (posterior / CL anterior)   Right : DNT   Left: Fleeting nystagmus, fleeting dizziness  Horizontal Canals   Right: " DNT   Left: DNT    Treatment Performed:    Modified Epley: Trial 2   Position 1: Positive rotary nystagmus lasting 1 second; positive fleeting dizziness   Position 2: negative nystamus; negative dizziness   Position 3: negative dizziness   Position 4: negative nystagmus; negative dizziness      Home Exercises Provided and Patient Education Provided     Education provided:   - POC, Proper technique with therapeutic interventions, Benefits/purpose of today's therapeutic interventions    Written Home Exercises Provided: no.    Assessment   Upon BPPV assessment today, Rafaela exhibits improvement of BPPV. She responded well to trial 1 of Modified Epley, exhibiting trace nystagmus and dizziness with repeat testing and maneuvers. When patient returns, she will be assessed for R BPPV and treated accordingly. Patient's c/o of disorientation in the car and in the elevator are not consistent with BPPV. If these symptoms persist beyond BPPV clearance, therapist will perform further vestibular assessment and update POC as appropriate.     Rafaela is progressing well towards her goals.   Pt prognosis is Excellent.     Pt will continue to benefit from skilled outpatient physical therapy to address the deficits listed in the problem list box on initial evaluation, provide pt/family education and to maximize pt's level of independence in the home and community environment.     Pt's spiritual, cultural and educational needs considered and pt agreeable to plan of care and goals.     Anticipated barriers to physical therapy: Co-morbidities, Patient is not driving currently       Goals:   Short Term Goals: 3 weeks   1. Patient will exhibit decreased frequency of vertigo episodes to >/= 1x per day. Ongoing  2. Patient will exhibit negative L [place]anna Hallpike for L Posterior Canal BPPV. Ongoing  3. Patient will exhibit negative R [place]anna Hallpike for R Posterior Canal BPPV. Ongoing  4. Patient will undergo remainder of vestibular assessment, pending  response to canalith repositioning maneuvers.  Ongoing        Long Term Goals: 8 weeks   1. Patient will exhibit improved Dizziness Handicap Inventory to 39, indicating decreased impact of dizziness on daily function. Ongoing  2. Pt will improve FOTO limitation score to </= 34% for improved self perception of functional mobility. Ongoing      Plan     Assess BPPV. Perform Vestibular assessment in symptoms persist.    Caro Griggs, PT

## 2020-08-05 ENCOUNTER — CLINICAL SUPPORT (OUTPATIENT)
Dept: REHABILITATION | Facility: HOSPITAL | Age: 75
End: 2020-08-05
Attending: SPECIALIST
Payer: MEDICARE

## 2020-08-05 DIAGNOSIS — H81.391 PERIPHERAL VERTIGO INVOLVING RIGHT EAR: ICD-10-CM

## 2020-08-05 DIAGNOSIS — H81.12 BPPV (BENIGN PAROXYSMAL POSITIONAL VERTIGO), LEFT: ICD-10-CM

## 2020-08-05 PROCEDURE — 97112 NEUROMUSCULAR REEDUCATION: CPT | Mod: PO

## 2020-08-05 NOTE — PROGRESS NOTES
"  Physical Therapy Daily Treatment Note     Name: Rafaela Pearce  Clinic Number: [id number]    Therapy Diagnosis:   Encounter Diagnoses   Name Primary?    BPPV (benign paroxysmal positional vertigo), left     Peripheral vertigo involving right ear      Physician: JUDD Aaron MD    Visit Date: 8/5/2020    Physician Orders: PT Eval and Treat "Vestibular Therapy "  Medical Diagnosis from Referral: H81.12 (ICD-10-CM) - BPPV (benign paroxysmal positional vertigo), left H81.391 (ICD-10-CM) - Peripheral vertigo involving right ear   Evaluation Date: 7/30/2020  Insurance Authorization Period: 07/27/2020 - 07/27/2021   Plan of Care Expiration: 09/30/2020  Visit # / Visits authorized: 03/ 01 (pending additional auth)     Time In: 1555   Time Out: 1600  Total Billable Time: 35 minutes      Precautions: Standard, Fall and Hx of Shoulder and Hip Replacements     Missed visits: 0  Cancelled visits: 0  Subjective     Pt reports: that she felt good for the rest of the day following last session, but dizziness returned the next day and is especially bad today.  HEP not provided at this time  Response to previous treatment: Overall improvement of dizziness  Functional change: Ongoing     Pain: 0/10  Location:  n/a     Objective     POSITIONAL CANAL TESTING  Looking for nystagmus (slow phase followed by quick phase to the affected side for BPPV)    Trial 1:  [place] Hallpike (posterior / CL anterior)   Right : DNT   Left: Positive torsional nystagmus ~10 seconds nystagmus, Positive dizziness  Horizontal Canals   Right: DNT   Left: DNT    Treatment Performed:    Modified Epley: Trial 1   Position 1: Positive rotary nystagmus lasting ~10 seconds; positive dizziness   Position 2: Positive rotary nystagmus lasting ~5 seconds; positive dizziness   Position 3: mild fleeting dizziness   Position 4: negative nystagmus; negative dizziness    3. Gait with horizontal head turns  = Mod staggering, path deviation, mod dizziness  4. Gait with " "vertical head turns = Mod staggering, path deviation, mod dizziness      LAURY SENSORY TESTING:  (P= Pass, F= Fail; note any sway; hold each position for 30")  Condition 1: (firm surface/feet together/eyes open) 30  Condition 2: (firm surface/feet together/eyes closed) 30, mod sway  Condition 3: (firm surface/feet in tandem/eyes open) DNT  Condition 4: (firm surface/feet in tandem/eyes closed) DNT  Condition 5: (soft surface/feet together/eyes open) 30  Condition 6: (soft surface/feet together/eyes closed) 5 sec  Condition 7: (Fakuda step test), measure distance varied from center starting position, > 30 deg deviation to either side indicates hypofunction of biased side DNT due to safety concerns    Home Exercises Provided and Patient Education Provided     Education provided:   - POC, Proper technique with therapeutic interventions, Benefits/purpose of today's therapeutic interventions    Written Home Exercises Provided: no.    Assessment   Upon BPPV assessment today, Rafaela exhibits pure torsional nystagmus suggesting central component to her dizziness. She reports that the dizziness is no longer "waving" in nature, but states that it feels more like lightheadeded/disorientation. Upon assessment today, she exhibited decreased tolerance to head movements during ambulation tasks. She also exhibited vision dependence with static balance testing. Rafaela will benefit from continued skilled therapy to improve tolerance to head motion as well as to decrease visual dependence.     Rafaela is progressing well towards her goals.   Pt prognosis is Excellent.     Pt will continue to benefit from skilled outpatient physical therapy to address the deficits listed in the problem list box on initial evaluation, provide pt/family education and to maximize pt's level of independence in the home and community environment.     Pt's spiritual, cultural and educational needs considered and pt agreeable to plan of care and goals.     Anticipated " barriers to physical therapy: Co-morbidities, Patient is not driving currently       Goals:   Short Term Goals: 3 weeks   1. Patient will exhibit decreased frequency of vertigo episodes to >/= 1x per day. Ongoing  2. Patient will exhibit negative L [place]anna Hallpike for L Posterior Canal BPPV. Ongoing  3. Patient will exhibit negative R [place] Hallpike for R Posterior Canal BPPV. Ongoing  4. Patient will undergo remainder of vestibular assessment, pending response to canalith repositioning maneuvers.  Ongoing        Long Term Goals: 8 weeks   1. Patient will exhibit improved Dizziness Handicap Inventory to 39, indicating decreased impact of dizziness on daily function. Ongoing  2. Pt will improve FOTO limitation score to </= 34% for improved self perception of functional mobility. Ongoing      Plan     Dynamic habituation training, vision-eliminated balance    Caro Griggs, PT

## 2020-08-10 ENCOUNTER — CLINICAL SUPPORT (OUTPATIENT)
Dept: REHABILITATION | Facility: HOSPITAL | Age: 75
End: 2020-08-10
Attending: SPECIALIST
Payer: MEDICARE

## 2020-08-10 DIAGNOSIS — H81.391 PERIPHERAL VERTIGO INVOLVING RIGHT EAR: ICD-10-CM

## 2020-08-10 DIAGNOSIS — H81.12 BPPV (BENIGN PAROXYSMAL POSITIONAL VERTIGO), LEFT: ICD-10-CM

## 2020-08-10 PROCEDURE — 97112 NEUROMUSCULAR REEDUCATION: CPT | Mod: PO

## 2020-08-10 NOTE — PROGRESS NOTES
"  Physical Therapy Daily Treatment Note     Name: Rafaela Pearce  Clinic Number: [id number]    Therapy Diagnosis:   Encounter Diagnoses   Name Primary?    BPPV (benign paroxysmal positional vertigo), left     Peripheral vertigo involving right ear      Physician: JUDD Aaron MD    Visit Date: 8/10/2020    Physician Orders: PT Eval and Treat "Vestibular Therapy "  Medical Diagnosis from Referral: H81.12 (ICD-10-CM) - BPPV (benign paroxysmal positional vertigo), left H81.391 (ICD-10-CM) - Peripheral vertigo involving right ear   Evaluation Date: 7/30/2020  Insurance Authorization Period: 07/27/2020 - 07/27/2021   Plan of Care Expiration: 09/30/2020  Visit # / Visits authorized: 03/ 20 4 total     Time In: 1515   Time Out: 1555   Total Billable Time: 40 minutes      Precautions: Standard, Fall and Hx of Shoulder and Hip Replacements     Missed visits: 0  Cancelled visits: 0  Subjective     Pt reports: that she has been feeling good. She reports occasional swaying, as if she were on a boat.   HEP not provided at this time  Response to previous treatment: Overall improvement of dizziness  Functional change: Ongoing     Pain: 0/10  Location:  n/a     Objective     Rafaela participated in neuromuscular re-education activities to improve: Balance and Vestibular Function for 45 minutes. The following activities were included:    Seated, horizontal head turns, eyes open 2 x 30s, no dizziness  Seated, vertical head turns, eyes open 2 x 30s, no dizziness  Seated, diagonal head turns, eyes open 2 x 30s, no dizziness     Seated cone pick ups - Picking up med cone from step stool directly in front of her and places on the mat to her left x 2 trials, no dizziness  Seated cone pick ups - Picking up med cone from step stool directly in front of her and places on the mat to her right x 2 trials, no dizziness    Ambulation with horizontal head turns 4 x 20 feet, mild dizziness, CGA - SBA  Ambulation with vertical head turns 4 x 20 " feet, mod dizziness, CGA - SBA    Static balance:  Firm surface, eyes closed 2 x 30s  Firm surface, eyes closed R<>L Head turns 2 x 30s  Firm surface, eyes closed up<>down Head turns 2 x 30s    Home Exercises Provided and Patient Education Provided     Education provided:   - POC, Proper technique with therapeutic interventions, Benefits/purpose of today's therapeutic interventions    Written Home Exercises Provided: no.    Assessment   Rafaela tolerated today's session fairly well. No significant dizziness was provoked with seated habituation tasks. However, moderate unsteadiness was observed with ambulation with up <> down head turns. Standing habituation and balance limited due to back pain.     Rafaela is progressing well towards her goals.   Pt prognosis is Excellent.     Pt will continue to benefit from skilled outpatient physical therapy to address the deficits listed in the problem list box on initial evaluation, provide pt/family education and to maximize pt's level of independence in the home and community environment.     Pt's spiritual, cultural and educational needs considered and pt agreeable to plan of care and goals.     Anticipated barriers to physical therapy: Co-morbidities, Patient is not driving currently       Goals:   Short Term Goals: 3 weeks   1. Patient will exhibit decreased frequency of vertigo episodes to >/= 1x per day. Ongoing  2. Patient will exhibit negative L [place] Hallpike for L Posterior Canal BPPV. Ongoing  3. Patient will exhibit negative R [place] Hallpike for R Posterior Canal BPPV. Ongoing  4. Patient will undergo remainder of vestibular assessment, pending response to canalith repositioning maneuvers.  Ongoing        Long Term Goals: 8 weeks   1. Patient will exhibit improved Dizziness Handicap Inventory to 39, indicating decreased impact of dizziness on daily function. Ongoing  2. Pt will improve FOTO limitation score to </= 34% for improved self perception of functional mobility.  Ongoing      Plan     Dynamic habituation training, vision-eliminated balance    Caro Griggs, PT

## 2020-08-21 ENCOUNTER — CLINICAL SUPPORT (OUTPATIENT)
Dept: REHABILITATION | Facility: HOSPITAL | Age: 75
End: 2020-08-21
Attending: SPECIALIST
Payer: MEDICARE

## 2020-08-21 DIAGNOSIS — H81.12 BPPV (BENIGN PAROXYSMAL POSITIONAL VERTIGO), LEFT: ICD-10-CM

## 2020-08-21 DIAGNOSIS — H81.391 PERIPHERAL VERTIGO INVOLVING RIGHT EAR: Primary | ICD-10-CM

## 2020-08-21 PROCEDURE — 95992 CANALITH REPOSITIONING PROC: CPT | Mod: PO

## 2020-08-21 PROCEDURE — 97112 NEUROMUSCULAR REEDUCATION: CPT | Mod: PO

## 2020-08-21 NOTE — PROGRESS NOTES
"  Physical Therapy Daily Treatment Note     Name: Rafaela Pearec  Clinic Number: [id number]    Therapy Diagnosis:   Encounter Diagnoses   Name Primary?    BPPV (benign paroxysmal positional vertigo), left     Peripheral vertigo involving right ear Yes     Physician: JUDD Aaron MD    Visit Date: 8/21/2020    Physician Orders: PT Eval and Treat "Vestibular Therapy "  Medical Diagnosis from Referral: H81.12 (ICD-10-CM) - BPPV (benign paroxysmal positional vertigo), left H81.391 (ICD-10-CM) - Peripheral vertigo involving right ear   Evaluation Date: 7/30/2020  Insurance Authorization Period: 07/27/2020 - 07/27/2021   Plan of Care Expiration: 09/30/2020  Visit # / Visits authorized: 04/ 20 5 total     Time In: 1500   Time Out: 1545   Total Billable Time: 45 minutes      Precautions: Standard, Fall and Hx of Shoulder and Hip Replacements     Missed visits: 0  Cancelled visits: 1  Subjective     Pt reports: improvement, but 2 spinning episodes as well as continued sensation of "waves" or being on a boat with walking.  HEP not provided at this time  Response to previous treatment: Overall improvement of dizziness  Functional change: Ongoing     Pain: 0/10  Location:  n/a     Objective     Rafaela participated in neuromuscular re-education activities to improve: Balance and Vestibular Function for 45 minutes. The following activities were included:     Ambulation with horizontal head turns 4 x 20 feet, mild dizziness, CGA - SBA  Ambulation with vertical head turns 4 x 20 feet, mod dizziness, CGA - SBA  diagonals  Ambulation with eyes closed 2 x 20 feet, mod dizziness and imbalance, SBA x 2    Static balance: SBA, unless otherwise stated  Firm surface, eyes closed 2 x 30s  Firm surface, eyes open diagonal (each direction) head turns 2 x 30s  Foam Surface, eyes open R <> L head turns 2 x 30s   Foam Surface, eyes open up <> down head turns 2 x 30s   Foam Surface, eyes closed 2 x 30s, CGA      POSITIONAL CANAL " "TESTING  Looking for nystagmus (slow phase followed by quick phase to the affected side for BPPV)    [place] Hallpike (posterior / CL anterior)   Right : Positive fleeting nystagmus, Positive fleeting dizziness   Left: Negative nystagmus, Negative dizziness  Horizontal Canals   Right: DNT   Left: DNT  Treatment Performed:    Modified Epley: Trial 1   Position 1: Positive fleeting nystagmus and vertigo   Position 2: no nystagmus, no dizziness   Position 3: no nystagmus, no dizziness   Position 4: no nystagmus, no dizziness     Hyperventilation test: Negative    Home Exercises Provided and Patient Education Provided     Education provided:   - POC, Proper technique with therapeutic interventions, Benefits/purpose of today's therapeutic interventions    Written Home Exercises Provided: no.    Assessment   Rafaela tolerated today's session fairly well, with rest break provided throughout session. She was appropriately challenged with all habituation interventions today and will benefit from continued habituation training. Due to her reports of "spinning" when lying down, positional testing was performed to assess for BPPV. She did exhibit fleeting nystagmus and vertigo, for which Epley was performed; however the nystagmus was pure torsional, suggesting central involvement. Will continue to assess for BPPV; but will likely opt for positional habituation training.    Rafaela is progressing well towards her goals.   Pt prognosis is Excellent.     Pt will continue to benefit from skilled outpatient physical therapy to address the deficits listed in the problem list box on initial evaluation, provide pt/family education and to maximize pt's level of independence in the home and community environment.     Pt's spiritual, cultural and educational needs considered and pt agreeable to plan of care and goals.     Anticipated barriers to physical therapy: Co-morbidities, Patient is not driving currently       Goals:   Short Term Goals: 3 " weeks   1. Patient will exhibit decreased frequency of vertigo episodes to >/= 1x per day. Ongoing  2. Patient will exhibit negative L [place] Hallpike for L Posterior Canal BPPV. Ongoing  3. Patient will exhibit negative R [place] Hallpike for R Posterior Canal BPPV. Ongoing  4. Patient will undergo remainder of vestibular assessment, pending response to canalith repositioning maneuvers.  Ongoing      Long Term Goals: 8 weeks   1. Patient will exhibit improved Dizziness Handicap Inventory to 39, indicating decreased impact of dizziness on daily function. Ongoing  2. Pt will improve FOTO limitation score to </= 34% for improved self perception of functional mobility. Ongoing      Plan     Dynamic habituation training, vision-eliminated balance    Caro Anson, PT

## 2020-09-04 ENCOUNTER — CLINICAL SUPPORT (OUTPATIENT)
Dept: REHABILITATION | Facility: HOSPITAL | Age: 75
End: 2020-09-04
Attending: SPECIALIST
Payer: MEDICARE

## 2020-09-04 DIAGNOSIS — H81.391 PERIPHERAL VERTIGO INVOLVING RIGHT EAR: ICD-10-CM

## 2020-09-04 DIAGNOSIS — H81.12 BPPV (BENIGN PAROXYSMAL POSITIONAL VERTIGO), LEFT: ICD-10-CM

## 2020-09-04 PROCEDURE — 97112 NEUROMUSCULAR REEDUCATION: CPT | Mod: PO

## 2020-09-09 ENCOUNTER — CLINICAL SUPPORT (OUTPATIENT)
Dept: REHABILITATION | Facility: HOSPITAL | Age: 75
End: 2020-09-09
Attending: SPECIALIST
Payer: MEDICARE

## 2020-09-09 DIAGNOSIS — H81.12 BPPV (BENIGN PAROXYSMAL POSITIONAL VERTIGO), LEFT: ICD-10-CM

## 2020-09-09 DIAGNOSIS — H81.391 PERIPHERAL VERTIGO INVOLVING RIGHT EAR: ICD-10-CM

## 2020-09-09 PROCEDURE — 97112 NEUROMUSCULAR REEDUCATION: CPT | Mod: PO

## 2020-09-09 NOTE — PROGRESS NOTES
"  Physical Therapy Daily Treatment Note     Name: Rafaela Pearce  Clinic Number: [id number]    Therapy Diagnosis:   Encounter Diagnoses   Name Primary?    BPPV (benign paroxysmal positional vertigo), left     Peripheral vertigo involving right ear      Physician: JUDD Aaron MD    Visit Date: 9/9/2020    Physician Orders: PT Eval and Treat "Vestibular Therapy "  Medical Diagnosis from Referral: H81.12 (ICD-10-CM) - BPPV (benign paroxysmal positional vertigo), left H81.391 (ICD-10-CM) - Peripheral vertigo involving right ear   Evaluation Date: 7/30/2020  Insurance Authorization Period: 07/27/2020 - 07/27/2021   Plan of Care Expiration: 09/30/2020  Visit # / Visits authorized: 06/ 20 7 total     Time In: 1515   Time Out: 1600  Total Billable Time: 45 minutes      Precautions: Standard, Fall and Hx of Shoulder and Hip Replacements     Missed visits: 0  Cancelled visits: 1  Subjective     Pt reports: an episode of spinning dizziness while lying on L side in bed. Episode was fleeting and was the first time in a while she has felt spinning.   She was not compliant with home exercise program.    Response to previous treatment: Overall improvement of dizziness  Functional change: Ongoing     Pain: 0/10  Location:  n/a     Objective     Rafaela participated in neuromuscular re-education activities to improve: Balance and Vestibular Function for 45 minutes. The following activities were included:     VOR Screen: horizontal, retinal slip at self-selected speed    Ambulation with horizontal head turns 2 x 20 feet, mild dizziness, CGA - SBA  Ambulation with vertical head turns 2 x 20 feet, mild dizziness, CGA - SBA  Ambulation with diagonals head turns 4 x 20 feet, mod dizziness, CGA - SBA    Bed Mobility Habituation: no signficant dizziness reproduced  Supine <> L sidelying x 10 reps  Supine <> R sidelying x 10 reps  L cervical rotation <> R cervical rotation x 10 reps  Supine <> L sidelying x 5 reps  Supine <> R sidelying " x 5 reps  EOM:   Picking up cones from floor of R and placing them on table to L, then returning the cones to the floor, 7 cones x 2 trials    Home Exercises Provided and Patient Education Provided     Education provided:   - POC, Proper technique with therapeutic interventions, Benefits/purpose of today's therapeutic interventions    Written Home Exercises Provided: no.    Assessment   Rafaela tolerated today's session fairly well, with rest break provided throughout session due to poor activity tolerance. Bed mobility habituation was performed today, with no significant symptom reproduction throughout. Therapy to continue to emphasize static and dynamic standing habituation.    Rafaela is progressing well towards her goals.   Pt prognosis is Excellent.     Pt will continue to benefit from skilled outpatient physical therapy to address the deficits listed in the problem list box on initial evaluation, provide pt/family education and to maximize pt's level of independence in the home and community environment.     Pt's spiritual, cultural and educational needs considered and pt agreeable to plan of care and goals.     Anticipated barriers to physical therapy: Co-morbidities, Patient is not driving currently       Goals:   Short Term Goals: 3 weeks   1. Patient will exhibit decreased frequency of vertigo episodes to >/= 1x per day. Ongoing  2. Patient will exhibit negative L [place] Hallpike for L Posterior Canal BPPV. Ongoing  3. Patient will exhibit negative R [place] Hallpike for R Posterior Canal BPPV. Ongoing  4. Patient will undergo remainder of vestibular assessment, pending response to canalith repositioning maneuvers.  Ongoing      Long Term Goals: 8 weeks   1. Patient will exhibit improved Dizziness Handicap Inventory to 39, indicating decreased impact of dizziness on daily function. Ongoing  2. Pt will improve FOTO limitation score to </= 34% for improved self perception of functional mobility. Ongoing      Plan      Dynamic habituation training, vision-eliminated balance    Caro Griggs, PT

## 2020-09-11 ENCOUNTER — CLINICAL SUPPORT (OUTPATIENT)
Dept: REHABILITATION | Facility: HOSPITAL | Age: 75
End: 2020-09-11
Attending: SPECIALIST
Payer: MEDICARE

## 2020-09-11 DIAGNOSIS — H81.12 BPPV (BENIGN PAROXYSMAL POSITIONAL VERTIGO), LEFT: ICD-10-CM

## 2020-09-11 DIAGNOSIS — H81.391 PERIPHERAL VERTIGO INVOLVING RIGHT EAR: ICD-10-CM

## 2020-09-11 PROCEDURE — 97112 NEUROMUSCULAR REEDUCATION: CPT | Mod: PO

## 2020-09-11 NOTE — PROGRESS NOTES
"  Physical Therapy Daily Treatment Note     Name: Rafaela Pearce  Clinic Number: [id number]    Therapy Diagnosis:   Encounter Diagnoses   Name Primary?    BPPV (benign paroxysmal positional vertigo), left     Peripheral vertigo involving right ear      Physician: JUDD Aaron MD    Visit Date: 9/11/2020    Physician Orders: PT Eval and Treat "Vestibular Therapy "  Medical Diagnosis from Referral: H81.12 (ICD-10-CM) - BPPV (benign paroxysmal positional vertigo), left H81.391 (ICD-10-CM) - Peripheral vertigo involving right ear   Evaluation Date: 7/30/2020  Insurance Authorization Period: 07/27/2020 - 07/27/2021   Plan of Care Expiration: 09/30/2020  Visit # / Visits authorized: 07/ 20 8 total     Time In: 1500    Time Out: 1541  Total Billable Time: 41 minutes      Precautions: Standard, Fall and Hx of Shoulder and Hip Replacements     Missed visits: 0  Cancelled visits: 1  Subjective     Pt reports: that she is feeling well today because she did not have to work today.  She was not compliant with home exercise program.    Response to previous treatment: Overall improvement of dizziness  Functional change: Ongoing     Pain: 0/10  Location:  n/a     Objective     Rafaela participated in neuromuscular re-education activities to improve: Balance and Vestibular Function for 45 minutes. The following activities were included:     VORx1:  Horizontal, no dizziness, mild retinal slip 3 x 30s  Vertical, no dizziness, no retinal slip 3 x 30s    Ambulation with horizontal head turns 4 x 20 feet, mild dizziness, CGA - SBA  Ambulation with vertical head turns 4 x 20 feet, mild dizziness, CGA - SBA  Ambulation with diagonals head turns 4 x 20 feet, mild dizziness, CGA - SBA  Ambulation with VORx1 horizontal 2 x 20 feet, mild dizziness, CGA - SBA  Ambulation with eyes closed <> open, 4 x 20 feet, CGA  Figure 8 ambulation around 2 large cones placed ~5 feet apart, 2 x 6 laps    Static Balance:  [place], eyes open, ea diagonal head " turns 2 x 30 s, CGA  Airex, eyes closed 2 x 30s, CGA  Airex, eyes closed R<>L head turns 2 x 30s, Min A  Airex, eyes closed up <> down head turns 2 x 30s, Min A    Home Exercises Provided and Patient Education Provided     Education provided:   - POC, Proper technique with therapeutic interventions, Benefits/purpose of today's therapeutic interventions    Written Home Exercises Provided: no.    Assessment   Rafaela tolerated today's session fairly well, with rest break provided throughout session due to poor activity tolerance. Gait quality declines significantly with vision eliminated, requiring close SBA. She tolerated addition of gaze stabilization exercises without increase in symptoms. She was appropriately challenged with static balance/habituation training, with the most difficulty occurring with vision eliminated tasks. Therapy to continue to progress habituation as tolerated.    Rafaela is progressing well towards her goals.   Pt prognosis is Excellent.     Pt will continue to benefit from skilled outpatient physical therapy to address the deficits listed in the problem list box on initial evaluation, provide pt/family education and to maximize pt's level of independence in the home and community environment.     Pt's spiritual, cultural and educational needs considered and pt agreeable to plan of care and goals.     Anticipated barriers to physical therapy: Co-morbidities, Patient is not driving currently       Goals:   Short Term Goals: 3 weeks   1. Patient will exhibit decreased frequency of vertigo episodes to >/= 1x per day. Ongoing  2. Patient will exhibit negative L [place] Hallpike for L Posterior Canal BPPV. Ongoing  3. Patient will exhibit negative R [place] Hallpike for R Posterior Canal BPPV. Ongoing  4. Patient will undergo remainder of vestibular assessment, pending response to canalith repositioning maneuvers.  Ongoing      Long Term Goals: 8 weeks   1. Patient will exhibit improved Dizziness Handicap  Inventory to 39, indicating decreased impact of dizziness on daily function. Ongoing  2. Pt will improve FOTO limitation score to </= 34% for improved self perception of functional mobility. Ongoing      Plan     Dynamic habituation training, vision-eliminated balance    Caro Anson, PT

## 2020-10-07 ENCOUNTER — DOCUMENTATION ONLY (OUTPATIENT)
Dept: REHABILITATION | Facility: HOSPITAL | Age: 75
End: 2020-10-07

## 2020-10-07 DIAGNOSIS — H81.12 BPPV (BENIGN PAROXYSMAL POSITIONAL VERTIGO), LEFT: Primary | ICD-10-CM

## 2020-10-07 DIAGNOSIS — H81.391 PERIPHERAL VERTIGO INVOLVING RIGHT EAR: ICD-10-CM

## 2020-10-07 NOTE — PROGRESS NOTES
"  OUTPATIENT PHYSICAL THERAPY DISCHARGE SUMMARY     Name: Rafaela Pearce  Clinic Number: [id number]    Diagnosis:   Encounter Diagnoses   Name Primary?    BPPV (benign paroxysmal positional vertigo), left Yes    Peripheral vertigo involving right ear      Physician: JUDD Aaron MD   Treatment Orders: PT Eval and Treat "Vestibular Therapy "   Past Medical History:   Diagnosis Date    Asthma     Hyperlipidemia     Hypertension     Polio        Initial visit: 7/30/2020   Date of Last visit: 09/11/2020  Date of Discharge Note:  10/07/2020  Total Visits Received: 8  Missed Visits: 0    ASSESSMENT   Rafaela Pearce presented for 7 follow up visits after the evaluation and then cancelled the remaining PT visits scheduled. The patient did not schedule any further sessions. Via telephone call, patient verbalized that she would like to discontinue vestibular rehab at this time due to lack of progress. She would like to f/u with Dr. Aaron before returning. Due to such limited PT attendance and unexpected pt self discharge, no progress noted and no final discharge measures were assessed.     Discharge reason : Patient self discharge    PLAN   This patient is discharged from [facility]patient [facility].     Caro Griggs, PT  10/07/2020      "

## 2021-03-05 ENCOUNTER — IMMUNIZATION (OUTPATIENT)
Dept: PRIMARY CARE CLINIC | Facility: CLINIC | Age: 76
End: 2021-03-05
Payer: MEDICARE

## 2021-03-05 DIAGNOSIS — Z23 NEED FOR VACCINATION: Primary | ICD-10-CM

## 2021-03-05 PROCEDURE — 0031A PR IMMUNIZ ADMIN, SARS-COV-2 COVID-19 VACC, 5X10VP/0.5ML: ICD-10-PCS | Mod: CV19,S$GLB,,

## 2021-03-05 PROCEDURE — 91303 PR SARSCOV2 VAC AD26 .5ML IM: ICD-10-PCS | Mod: S$GLB,,,

## 2021-03-05 PROCEDURE — 0031A PR IMMUNIZ ADMIN, SARS-COV-2 COVID-19 VACC, 5X10VP/0.5ML: CPT | Mod: CV19,S$GLB,,

## 2021-03-05 PROCEDURE — 91303 PR SARSCOV2 VAC AD26 .5ML IM: CPT | Mod: S$GLB,,,
